# Patient Record
Sex: MALE | Race: WHITE | NOT HISPANIC OR LATINO | Employment: FULL TIME | URBAN - METROPOLITAN AREA
[De-identification: names, ages, dates, MRNs, and addresses within clinical notes are randomized per-mention and may not be internally consistent; named-entity substitution may affect disease eponyms.]

---

## 2019-06-19 ENCOUNTER — OFFICE VISIT (OUTPATIENT)
Dept: INTERNAL MEDICINE | Facility: CLINIC | Age: 39
End: 2019-06-19
Payer: COMMERCIAL

## 2019-06-19 VITALS
TEMPERATURE: 98.5 F | OXYGEN SATURATION: 98 % | SYSTOLIC BLOOD PRESSURE: 140 MMHG | HEART RATE: 70 BPM | BODY MASS INDEX: 44.35 KG/M2 | DIASTOLIC BLOOD PRESSURE: 100 MMHG | WEIGHT: 315 LBS

## 2019-06-19 DIAGNOSIS — Z13.6 CARDIOVASCULAR SCREENING; LDL GOAL LESS THAN 130: ICD-10-CM

## 2019-06-19 DIAGNOSIS — Z13.6 CARDIOVASCULAR SCREENING; LDL GOAL LESS THAN 160: ICD-10-CM

## 2019-06-19 DIAGNOSIS — S16.1XXA NECK MUSCLE STRAIN, INITIAL ENCOUNTER: Primary | ICD-10-CM

## 2019-06-19 DIAGNOSIS — R03.0 ELEVATED BLOOD PRESSURE READING IN OFFICE WITHOUT DIAGNOSIS OF HYPERTENSION: ICD-10-CM

## 2019-06-19 DIAGNOSIS — E66.01 MORBID OBESITY WITH BMI OF 40.0-44.9, ADULT (H): ICD-10-CM

## 2019-06-19 PROCEDURE — 99214 OFFICE O/P EST MOD 30 MIN: CPT | Performed by: INTERNAL MEDICINE

## 2019-06-19 NOTE — PROGRESS NOTES
Subjective     Junito Coelho is a 39 year old male who presents to clinic today for the following health issues:    HPI   Neck Pain  Onset: 4 days    Description:   Location: Right  Side of neck   Radiation: none    Intensity: moderate    Progression of Symptoms:  same    Accompanying Signs & Symptoms:  Burning, prickly sensation (paresthesias) in arm(s): no   Numbness in arm(s): no   Weakness in arm(s):  no   Fever: no   Headache: YES  Nausea and/or vomiting: no     History:   Trauma: no   Previous neck pain: no   Previous surgery or injections: no   Previous Imaging (MRI,X ray): no     Precipitating factors:   Does movement increase the pain:  YES    Alleviating factors:  none    Therapies Tried and outcome:  none            Reviewed and updated as needed this visit by Provider         Review of Systems         Objective    BP (!) 140/100   Pulse 70   Temp 98.5  F (36.9  C) (Temporal)   Wt 144.2 kg (318 lb)   SpO2 98%   BMI 44.35 kg/m    Body mass index is 44.35 kg/m .  Physical Exam                 Past Medical History:  ---------------------------  Past Medical History:   Diagnosis Date     Abdominal pain      Gastro-oesophageal reflux disease      Morbid obesity with BMI of 40.0-44.9, adult (H)     BMI 44       Past Surgical History:  ---------------------------  Past Surgical History:   Procedure Laterality Date     LAPAROSCOPIC CHOLECYSTECTOMY N/A 6/19/2015    Procedure: LAPAROSCOPIC CHOLECYSTECTOMY;  Surgeon: John Yepez MD;  Location: [facility]     ORTHOPEDIC SURGERY      right hand       Current Medications:  ---------------------------  Current Outpatient Medications   Medication Sig Dispense Refill     ibuprofen (ADVIL) 200 MG capsule Take 400 mg by mouth every 2 hours as needed       Ranitidine HCl (ZANTAC PO)          Allergies:  -------------  No Known Allergies    Social History:  -------------------  Social History     Socioeconomic History     Marital status: Single     Spouse name: Not  on file     Number of children: Not on file     Years of education: Not on file     Highest education level: Not on file   Occupational History     Not on file   Social Needs     Financial resource strain: Not on file     Food insecurity:     Worry: Not on file     Inability: Not on file     Transportation needs:     Medical: Not on file     Non-medical: Not on file   Tobacco Use     Smoking status: Current Some Day Smoker     Packs/day: 0.50     Years: 15.00     Pack years: 7.50     Smokeless tobacco: Never Used   Substance and Sexual Activity     Alcohol use: No     Alcohol/week: 0.0 oz     Drug use: No     Sexual activity: Not on file   Lifestyle     Physical activity:     Days per week: Not on file     Minutes per session: Not on file     Stress: Not on file   Relationships     Social connections:     Talks on phone: Not on file     Gets together: Not on file     Attends Taoist service: Not on file     Active member of club or organization: Not on file     Attends meetings of clubs or organizations: Not on file     Relationship status: Not on file     Intimate partner violence:     Fear of current or ex partner: Not on file     Emotionally abused: Not on file     Physically abused: Not on file     Forced sexual activity: Not on file   Other Topics Concern     Not on file   Social History Narrative     Not on file       Family Medical History:  ------------------------------  Family History   Problem Relation Age of Onset     Hypertension Father      Coronary Artery Disease Father 56        heart attack at 56     Cerebrovascular Disease Mother 62        acute stroke with residual left sided hemiparesis     Family History Negative Sister      Family History Negative Sister      Family History Negative Sister      Family History Negative Sister          ROS:  REVIEW OF SYSTEMS:    RESP: negative for cough, dyspnea, wheezing, hemoptysis  CV: negative for chest pain, palpitations, PND, VILLARREAL, orthopnea; reports no  significant changes in their ability to perform physical activity (from cardiovascular standpoint)  GI: negative for dysphagia, N/V, pain, melena, diarrhea and constipation  NEURO: negative for new numbness/tingling, paralysis, incoordination, or focal weakness     OBJECTIVE:                                                    BP (!) 140/100   Pulse 70   Temp 98.5  F (36.9  C) (Temporal)   Wt 144.2 kg (318 lb)   SpO2 98%   BMI 44.35 kg/m       GENERAL alert and no distress  EYES:  Normal sclera,conjunctiva, EOMI  HENT: oral and posterior pharynx without lesions or erythema, facies symmetric  NECK: Neck supple. No LAD, without thyroidmegaly.  Neck shows tight cervical para cervical muscles in spasm. Palpation of these muscles does reproduce the pain exactly.   Normal , finger, bicep, and tricep strength in both arms.  Negative spurlings maneuver.   Normal bicep, tricep, brachioradialis reflexs on both sides.  Neck has FROM in all directions.  No pain with direct palpation of the cervical bodies themselves, the pain is in the surrounding soft/connective tissues.  RESP: Clear to ausculation bilaterally without wheezes or crackles. Normal BS in all fields.  CV: RRR normal S1S2 without murmurs, rubs or gallops.  LYMPH: no cervical lymph adenopathy appreciated  MS: extremities- no gross deformities of the visible extremities noted,   EXT:  no lower extremity edema  PSYCH: Alert and oriented times 3; speech- coherent  SKIN:  No obvious significant skin lesions on visible portions of face          ASSESSMENT/PLAN:                                                      (S16.1XXA) Neck muscle strain, initial encounter  (primary encounter diagnosis)  Comment: No evidence of disc herniation or verterbal injury.    Discussed typical mechanical neck pain, typical causes, and atypical neck pain, including red flag symptoms.  Discussed conservative tratments inclduing physical therpy, stretching and strengthening, use of heat  and/or ice, NSAIDs with food, antispasmodics where indicated, and the use of narcotic pain meds where indicated.      Plan:     (E66.01,  Z68.41) Obesity with BMI of 40.0-44.9, adult (H)  Comment: The patient's current BMI is: Body mass index is 44.35 kg/m ..  Reviewed their weight patterns.   Discussed obesity as a biological preventable and treatable disease, which is associated with significantly increased risk of many acute and chronic health conditions. Obesity has now been recognized as a chronic disease by the American Medical Association.  Discussed serious co-morbidities associated with obesity including increased risk for hypertension, stroke, coronary artery disease, dyslipidemia, Type II diabetes, depression, sleep apnea, cancers of the colon, breast and endometrium, obstructive sleep apnea, osteoarthritis and female infertility.  Recommended regular aerobic exercise, recommended improved diet aiming at lowering amount of processed foods, lower sugars, and lower wheat products, and moderation carbs and fat in the diet, establishing more regular meal times, always eating breakfast, front loading some of the calories and adding more protein to the diet.     Briefly discussed bariatric surgery as a consideration, especially in patients with BMI greater than or equal to 35 kg/m2 with multiple complications.     Plan: Lipid panel reflex to direct LDL Fasting,         Comprehensive metabolic panel, CBC with         platelets            (Z13.6) CARDIOVASCULAR SCREENING; LDL GOAL LESS THAN 160  Comment: Discussed cardiac disease risk factors and cardiac disease risk factor modification.   Plan:     (R03.0) Elevated blood pressure reading in office without diagnosis of hypertension  Comment:   Plan:     (Z13.6) CARDIOVASCULAR SCREENING; LDL GOAL LESS THAN 130  Comment:   Plan: Lipid panel reflex to direct LDL Fasting,         Comprehensive metabolic panel, CBC with         platelets              See Patient  Instructions    JEROME PINEDO M.D., MD  [facility]    (Chart documentation may have been completed, in part, with [employer] voice-recognition software. Even though reviewed, some grammatical, spelling, and word errors may remain.)

## 2019-06-19 NOTE — LETTER
[facility]  [address] [address]  [phone]        October 22, 2019    Junito Coelho  [address] [place] [id number]              Dear Junito Coelho    This is to remind you that your fasting labs is due.    You may call our office at [phone] to schedule an appointment.    Please disregard this notice if you have already had your labs drawn or made an appointment.        Sincerely,        Andrae Castorena MD

## 2019-06-19 NOTE — PATIENT INSTRUCTIONS
"  CERVICAL MUSCLE/NECK PAIN:     *  Based on your history and exam,  No evidence for herniated disc, spinal stenosis, or vertebral fracture or any other concerning cause.    *  Over the counter Anti-inflammatory medicine.  Take one of the following:     --Aleve 2 tablets twice per day (with food) every day for the next one week, then twice per day as needed.     OR   --Motrin/Advil/Ibuprofen 400-600 mg three times pre day for the next one week, then 2-3 times per day as needed    *  Moist heat in the form of a shower, hot towel, or microwavable bean bag few times per day can help relieve some of the spasm.  One example is a \"Bed Buddy\" available at most drug stores such as [employer].  See the web page :  Http://www.[employer]/products/detail/292 for more details.      *  If your symptoms worsen, do not improve, or If you develop worsening or concerning symptoms like fevers, chills, swallowing or speech difficulties, please contact us.  .         * Your blood pressure is slightly higher than it should be.  I cannot technically call it hypertension yet, and need to have more readings to get a sense of what your readings truly are.     Check you blood pressure outside the clinic and accumulate 10-15 readings over the next 2 months and bring these to your appointment.  If the readings are above 140/90 on a regular basis, that would represent Hypertension and would indicate treatment.       Blood pressure goals:  ==============================================================    Normal: less than 130/80  High normal: 130-140/80-90  Hypertension:  greater than 140/90    Call if blood pressure frequently outside of this range, especially associated with side effects.        *  Return to see me in 3 months for a routine physical, sooner if needed.  Please get fasting labs done at the [facility] or any other [facility] lab 1-2 days before this appointment.  If you get the labs done at another " [facility], make arrangements with them directly.  The orders will be in place.  Eat nothing for at least 8 hours prior to having these labs drawn.  Call [phone] to schedule appointments at [facility].

## 2020-08-28 ENCOUNTER — OFFICE VISIT (OUTPATIENT)
Dept: INTERNAL MEDICINE | Facility: CLINIC | Age: 40
End: 2020-08-28
Payer: COMMERCIAL

## 2020-08-28 VITALS
WEIGHT: 315 LBS | HEIGHT: 71 IN | SYSTOLIC BLOOD PRESSURE: 181 MMHG | BODY MASS INDEX: 44.1 KG/M2 | DIASTOLIC BLOOD PRESSURE: 119 MMHG | HEART RATE: 74 BPM | RESPIRATION RATE: 18 BRPM | OXYGEN SATURATION: 97 %

## 2020-08-28 DIAGNOSIS — K46.9 ABDOMINAL HERNIA WITHOUT OBSTRUCTION AND WITHOUT GANGRENE, RECURRENCE NOT SPECIFIED, UNSPECIFIED HERNIA TYPE: ICD-10-CM

## 2020-08-28 DIAGNOSIS — I10 HTN, GOAL BELOW 140/90: ICD-10-CM

## 2020-08-28 DIAGNOSIS — E66.01 MORBID OBESITY WITH BMI OF 40.0-44.9, ADULT (H): ICD-10-CM

## 2020-08-28 DIAGNOSIS — R06.83 SNORING: ICD-10-CM

## 2020-08-28 DIAGNOSIS — Z13.1 SCREENING FOR DIABETES MELLITUS: ICD-10-CM

## 2020-08-28 DIAGNOSIS — Z00.00 ROUTINE GENERAL MEDICAL EXAMINATION AT A HEALTH CARE FACILITY: Primary | ICD-10-CM

## 2020-08-28 PROCEDURE — 99213 OFFICE O/P EST LOW 20 MIN: CPT | Mod: 25 | Performed by: INTERNAL MEDICINE

## 2020-08-28 PROCEDURE — 93000 ELECTROCARDIOGRAM COMPLETE: CPT | Performed by: INTERNAL MEDICINE

## 2020-08-28 PROCEDURE — 99395 PREV VISIT EST AGE 18-39: CPT | Performed by: INTERNAL MEDICINE

## 2020-08-28 RX ORDER — AMLODIPINE BESYLATE 5 MG/1
5 TABLET ORAL DAILY
Qty: 30 TABLET | Refills: 1 | Status: SHIPPED | OUTPATIENT
Start: 2020-08-28 | End: 2020-10-12

## 2020-08-28 ASSESSMENT — ENCOUNTER SYMPTOMS
PARESTHESIAS: 0
FEVER: 0
JOINT SWELLING: 0
NAUSEA: 0
CONSTIPATION: 0
FREQUENCY: 0
ARTHRALGIAS: 1
DYSURIA: 0
HEADACHES: 0
PALPITATIONS: 0
SHORTNESS OF BREATH: 0
HEARTBURN: 0
NERVOUS/ANXIOUS: 0
SORE THROAT: 0
MYALGIAS: 0
DIZZINESS: 0
HEMATURIA: 0
WEAKNESS: 0
HEMATOCHEZIA: 0
CHILLS: 0
EYE PAIN: 0
ABDOMINAL PAIN: 0
DIARRHEA: 0
COUGH: 0

## 2020-08-28 ASSESSMENT — MIFFLIN-ST. JEOR: SCORE: 2415.86

## 2020-08-28 ASSESSMENT — PATIENT HEALTH QUESTIONNAIRE - PHQ9
SUM OF ALL RESPONSES TO PHQ QUESTIONS 1-9: 0
10. IF YOU CHECKED OFF ANY PROBLEMS, HOW DIFFICULT HAVE THESE PROBLEMS MADE IT FOR YOU TO DO YOUR WORK, TAKE CARE OF THINGS AT HOME, OR GET ALONG WITH OTHER PEOPLE: NOT DIFFICULT AT ALL
SUM OF ALL RESPONSES TO PHQ QUESTIONS 1-9: 0

## 2020-08-28 NOTE — PROGRESS NOTES
Dr Gold's note    Patient's instructions / PLAN:                                                        Plan:  1. Please make a lab appointment for fasting labs    2. Start Amlodipine 5 mg daily - for the blood pressure   3. Sleep center referral   4. Low salt diet will help the blood pressure  5. Blood pressure machine with a big cuff - it will be beneficial for you   6. Referral  [facility], [place] [phone]  7. Follow up 3-4 weeks         ASSESSMENT & PLAN:                                                      (Z00.00) Routine general medical examination at a health care facility  (primary encounter diagnosis)  Comment:   Plan: CBC with platelets, Comprehensive metabolic         panel, Lipid panel reflex to direct LDL         Fasting, Albumin Random Urine Quantitative with        Creat Ratio, TSH with free T4 reflex            (I10) HTN, goal below 140/90  Comment: new dx. we discussed the importance of having the blood pressure under control  We discussed about the new meds, advantages and potential side effects. The patient will read also the info from the pharmacy and call back if questions.   Plan: EKG 12-lead complete w/read - Clinics,         amLODIPine (NORVASC) 5 MG tablet, CBC with         platelets, Comprehensive metabolic panel, Lipid        panel reflex to direct LDL Fasting, Albumin         Random Urine Quantitative with Creat Ratio, TSH        with free T4 reflex, SLEEP EVALUATION &         MANAGEMENT REFERRAL - [facility]         [facility] [place]  [phone] (Age 18 and         up)            (R06.83) Snoring  Comment: We discussed the importance of having JAYDEN treated if he is diagnosed with it   Plan: SLEEP EVALUATION & MANAGEMENT REFERRAL - [facility]          [phone] (Age 18 and up)            (K46.9) Abdominal hernia without obstruction and without gangrene, recurrence not specified, unspecified hernia  type  Comment:   Plan: GENERAL SURG ADULT REFERRAL            (Z13.1) Screening for diabetes mellitus  Comment:   Plan: Hemoglobin A1c            (E66.01,  Z68.41) Obesity with BMI of 40.0-44.9, adult (H)  Comment:   Plan:  we discussed about diet ( reducing calories intake) and increasing the exercise           Chief Complaint:                                                      Annual exam  Follow up chronic medical problems      SUBJECTIVE:                                                    History of present illness     We reviewed the chronic medical problems as above.   I reviewed the recent tests results in Epic       Fam Hx of HTN and HLip    DOT told him he might have umbilical hernia. I felt something abobe umilical area, but not sure due to fat abd    HTN  --He had a blood pressure check to DOT exam.  He does not remember the numbers.  He does not remember he was told it is very high  --I checked the blood pressure twice and it is in the same range    ROS:    See below        PMHx: - reviewed  Past Medical History:   Diagnosis Date     Abdominal pain      Gastro-oesophageal reflux disease      Morbid obesity with BMI of 40.0-44.9, adult (H)     BMI 44         PSHx: reviewed  Past Surgical History:   Procedure Laterality Date     LAPAROSCOPIC CHOLECYSTECTOMY N/A 6/19/2015    Procedure: LAPAROSCOPIC CHOLECYSTECTOMY;  Surgeon: John Yepez MD;  Location: [facility]     ORTHOPEDIC SURGERY      right hand        Soc Hx: No daily alcohol, no smoking  Social History     Socioeconomic History     Marital status: Single     Spouse name: Not on file     Number of children: Not on file     Years of education: Not on file     Highest education level: Not on file   Occupational History     Not on file   Social Needs     Financial resource strain: Not on file     Food insecurity     Worry: Not on file     Inability: Not on file     Transportation needs     Medical: Not on file     Non-medical: Not on file  "  Tobacco Use     Smoking status: Current Some Day Smoker     Packs/day: 0.50     Years: 15.00     Pack years: 7.50     Smokeless tobacco: Never Used   Substance and Sexual Activity     Alcohol use: No     Alcohol/week: 0.0 standard drinks     Drug use: No     Sexual activity: Not on file   Lifestyle     Physical activity     Days per week: Not on file     Minutes per session: Not on file     Stress: Not on file   Relationships     Social connections     Talks on phone: Not on file     Gets together: Not on file     Attends Mu-ism service: Not on file     Active member of club or organization: Not on file     Attends meetings of clubs or organizations: Not on file     Relationship status: Not on file     Intimate partner violence     Fear of current or ex partner: Not on file     Emotionally abused: Not on file     Physically abused: Not on file     Forced sexual activity: Not on file   Other Topics Concern     Not on file   Social History Narrative     Not on file        Fam Hx: reviewed  Family History   Problem Relation Age of Onset     Cerebrovascular Disease Mother 62        acute stroke with residual left sided hemiparesis     Hypertension Father      Coronary Artery Disease Father 56        heart attack at 56     Family History Negative Sister      Family History Negative Sister      Family History Negative Sister      Family History Negative Sister          Screening: reviewed    All: reviewed    Meds: reviewed  Current Outpatient Medications   Medication Sig Dispense Refill     ibuprofen (ADVIL) 200 MG capsule Take 400 mg by mouth every 2 hours as needed       Ranitidine HCl (ZANTAC PO)              OBJECTIVE:                                                    Physical Exam :    Blood pressure (!) 181/119, pulse 74, resp. rate 18, height 1.803 m (5' 11\"), weight 147.9 kg (326 lb), SpO2 97 %.     NAD, appears comfortable  Skin clear, no rashes  HEENT: Crowded throat  Neck: supple, no JVD,  no " thyroidmegaly  Lymph nodes non palpable in the cervical, supraclavicular axillaries,  Chest: clear to auscultation with good respiratory effort  Cardiac: S1S2, RRR, no mgr appreciated  Abdomen: soft, not tender, not distended, audible bowel sound, no hepatosplenomegaly, no palpable masses, no abdominal bruits  Extremities: no cyanosis, clubbing or edema.   Neuro: A, Ox3, no focal signs.        Rosa Gold MD  Internal Medicine       SUBJECTIVE:   CC: Junito Coelho is an 40 year old male who presents for preventative health visit.     Healthy Habits:     Getting at least 3 servings of Calcium per day:  NO    Bi-annual eye exam:  NO    Dental care twice a year:  NO    Sleep apnea or symptoms of sleep apnea:  None, Daytime drowsiness and Excessive snoring    Diet:  Other    Frequency of exercise:  None    Taking medications regularly:  Yes    Medication side effects:  None    PHQ-2 Total Score: 0    Additional concerns today:  No    Ability to successfully perform activities of daily living: Yes, no assistance needed  Home safety:  none identified   Hearing impairment: No            Today's PHQ-2 Score:   PHQ-2 ( 1999 [employer]) 8/28/2020   Q1: Little interest or pleasure in doing things 0   Q2: Feeling down, depressed or hopeless 0   PHQ-2 Score 0   Q1: Little interest or pleasure in doing things Not at all   Q2: Feeling down, depressed or hopeless Not at all   PHQ-2 Score 0       Abuse: Current or Past(Physical, Sexual or Emotional)- No  Do you feel safe in your environment? Yes        Social History     Tobacco Use     Smoking status: Current Some Day Smoker     Packs/day: 0.50     Years: 15.00     Pack years: 7.50     Smokeless tobacco: Never Used   Substance Use Topics     Alcohol use: No     Alcohol/week: 0.0 standard drinks         Alcohol Use 8/28/2020   Prescreen: >3 drinks/day or >7 drinks/week? No       Last PSA: No results found for: PSA    Reviewed orders with patient. Reviewed health maintenance and  "updated orders accordingly - Yes  Labs reviewed in EPIC    Reviewed and updated as needed this visit by clinical staff  Allergies  Meds         Reviewed and updated as needed this visit by Provider            Review of Systems   Constitutional: Negative for chills and fever.   HENT: Negative for congestion, ear pain, hearing loss and sore throat.    Eyes: Negative for pain and visual disturbance.   Respiratory: Negative for cough and shortness of breath.    Cardiovascular: Negative for chest pain, palpitations and peripheral edema.   Gastrointestinal: Negative for abdominal pain, constipation, diarrhea, heartburn, hematochezia and nausea.   Genitourinary: Negative for dysuria, frequency, genital sores, hematuria and urgency.   Musculoskeletal: Positive for arthralgias. Negative for joint swelling and myalgias.   Skin: Negative for rash.   Neurological: Negative for dizziness, weakness, headaches and paresthesias.   Psychiatric/Behavioral: Negative for mood changes. The patient is not nervous/anxious.          COUNSELING:   Reviewed preventive health counseling, as reflected in patient instructions       Regular exercise       Healthy diet/nutrition    Estimated body mass index is 44.35 kg/m  as calculated from the following:    Height as of 7/11/16: 1.803 m (5' 11\").    Weight as of 6/19/19: 144.2 kg (318 lb).     Weight management plan: Discussed healthy diet and exercise guidelines    He reports that he has been smoking. He has a 7.50 pack-year smoking history. He has never used smokeless tobacco.  Tobacco Cessation Action Plan:   .  I advised him to quit.  I explained him it increases his the risk for cardiovascular events      Counseling Resources:  ATP IV Guidelines  Pooled Cohorts Equation Calculator  FRAX Risk Assessment  ICSI Preventive Guidelines  Dietary Guidelines for Americans, 2010  USDA's MyPlate  ASA Prophylaxis  Lung CA Screening    Rosa Maldonado MD  [facility] " TEREZA  Answers for HPI/ROS submitted by the patient on 8/28/2020   Annual Exam:  If you checked off any problems, how difficult have these problems made it for you to do your work, take care of things at home, or get along with other people?: Not difficult at all  PHQ9 TOTAL SCORE: 0

## 2020-08-28 NOTE — PATIENT INSTRUCTIONS
Plan:  1. Please make a lab appointment for fasting labs    2. Start Amlodipine 5 mg daily - for the blood pressure   3. Sleep center referral   4. Low salt diet will help the blood pressure  5. Blood pressure machine with a big cuff - it will be beneficial for you   6. Referral  [facility], [place] [phone]  7. Follow up 3-4 weeks

## 2020-08-29 ASSESSMENT — PATIENT HEALTH QUESTIONNAIRE - PHQ9: SUM OF ALL RESPONSES TO PHQ QUESTIONS 1-9: 0

## 2020-09-14 ENCOUNTER — OFFICE VISIT (OUTPATIENT)
Dept: SURGERY | Facility: CLINIC | Age: 40
End: 2020-09-14
Payer: COMMERCIAL

## 2020-09-14 VITALS
OXYGEN SATURATION: 97 % | HEIGHT: 71 IN | WEIGHT: 315 LBS | DIASTOLIC BLOOD PRESSURE: 110 MMHG | BODY MASS INDEX: 44.1 KG/M2 | RESPIRATION RATE: 16 BRPM | HEART RATE: 80 BPM | SYSTOLIC BLOOD PRESSURE: 162 MMHG

## 2020-09-14 DIAGNOSIS — K43.2 INCISIONAL HERNIA, WITHOUT OBSTRUCTION OR GANGRENE: Primary | ICD-10-CM

## 2020-09-14 PROCEDURE — 99243 OFF/OP CNSLTJ NEW/EST LOW 30: CPT | Performed by: SURGERY

## 2020-09-14 ASSESSMENT — MIFFLIN-ST. JEOR: SCORE: 2415.86

## 2020-09-14 NOTE — PROGRESS NOTES
[facility]  New Patient Office Visit      Assessment:    Junito Coelho is a 40 year old male with a Primary, reducible incisional hernia.    Plan:    Currently hernia is asymptomatic. We discussed requirements prior to repair would include smoking cessation and weight loss. Current BMI is 45. Goal BMI <35 or 20% weight loss, which would be about 70 lbs.  Briefly discussed weight loss programs available. Pt will work on weight loss and consider surgery, currently okay with just watching the hernia.    We have discussed observation, reduction techniques and importance, incarceration and strangulation signs, symptoms and importance as well as need to seek emergency treatment.      He has been given literature to review.         Junito Coelho is seen in consultation for a hernia, at the request of Rosa Maldonado MD.      HPI:  Junito Coelho is a 40 year old male who presents for evaluation of a lump in the upper midline.  He first noticed a lump when his primary doctor did his physical recently    He does not have pain unless his abdomen is hit, like if his 7 year old son jumps on him.     Nausea/vomitting/bloating:  No    Previous surgery in this location:  Yes - paul rodriguez 2015     Previous herniorrhaphy:  No     Constipation: No  Cough: No  Diabetes: No  Current Smoker: Yes  Heavy lifting > 20 lb: No     Past Medical History:  Past Medical History:   Diagnosis Date     Abdominal pain      Gastro-oesophageal reflux disease      Morbid obesity with BMI of 40.0-44.9, adult (H)     BMI 44       Past Surgical History:  Past Surgical History:   Procedure Laterality Date     LAPAROSCOPIC CHOLECYSTECTOMY N/A 6/19/2015    Procedure: LAPAROSCOPIC CHOLECYSTECTOMY;  Surgeon: John Yepez MD;  Location: [facility]     ORTHOPEDIC SURGERY      right hand        Social History:  Social History     Tobacco Use     Smoking status: Current Some Day Smoker     Packs/day: 0.50     Years: 15.00     Pack  "years: 7.50     Smokeless tobacco: Never Used   Substance Use Topics     Alcohol use: No     Alcohol/week: 0.0 standard drinks     Drug use: No          Family History:  Family History   Problem Relation Age of Onset     Cerebrovascular Disease Mother 62        acute stroke with residual left sided hemiparesis     Hypertension Father      Coronary Artery Disease Father 56        heart attack at 56     Family History Negative Sister      Family History Negative Sister      Family History Negative Sister      Family History Negative Sister        ROS:  The 10 point review of systems is negative other than noted in the HPI and/or below.    PE:    Vitals: BP (!) 162/110   Pulse 80   Resp 16   Ht 1.803 m (5' 11\")   Wt 147.9 kg (326 lb)   SpO2 97%   BMI 45.47 kg/m    BMI= Body mass index is 45.47 kg/m .    General: Generally appears well.  Psych: Alert and Oriented.  Normal affect  Neurological: non-focal, moves extremities symmetrically, grossly normal strength and sensation  Eyes: Sclera clear  Respiratory: Breathing comfortably on room air  Cardiovascular:  Regular rate on pulse check  GI: Abdomen Obese. Small Bulge just above prior supraumbilical incision, reduces on laying down, non-tender. Defect feels fairly small but difficult to tell due to body habitus  MSK: extremities without edema  Integumentary:  No rashes      Time spent with the patient with greater that 50% of the time in discussion was 20 minutes.     Kenna Cadet MD  09/14/20 4:21 PM     Please route or send letter to:  Referring Provider    "

## 2020-09-14 NOTE — LETTER
"2020    RE:Junito Coelho, : 1980      [facility]  New Patient Office Visit        Assessment:    Junito Coelho is a 40 year old male with a Primary, reducible incisional hernia.     Plan:    Currently hernia is asymptomatic. We discussed requirements prior to repair would include smoking cessation and weight loss. Current BMI is 45. Goal BMI <35 or 20% weight loss, which would be about 70 lbs.  Briefly discussed weight loss programs available. Pt will work on weight loss and consider surgery, currently okay with just watching the hernia.     We have discussed observation, reduction techniques and importance, incarceration and strangulation signs, symptoms and importance as well as need to seek emergency treatment.       He has been given literature to review.       Junito Coelho is seen in consultation for a hernia, at the request of Rosa Maldonado MD.      HPI:  Junito Coelho is a 40 year old male who presents for evaluation of a lump in the upper midline.  He first noticed a lump when his primary doctor did his physical recently     He does not have pain unless his abdomen is hit, like if his 7 year old son jumps on him.      Nausea/vomitting/bloating:  No    Previous surgery in this location:  Yes - paul rodriguez      Previous herniorrhaphy:  No      Constipation: No  Cough: No  Diabetes: No  Current Smoker: Yes  Heavy lifting > 20 lb: No      ROS:  The 10 point review of systems is negative other than noted in the HPI and/or below.     PE:    Vitals: BP (!) 162/110   Pulse 80   Resp 16   Ht 1.803 m (5' 11\")   Wt 147.9 kg (326 lb)   SpO2 97%   BMI 45.47 kg/m    BMI= Body mass index is 45.47 kg/m .     General: Generally appears well.  Psych: Alert and Oriented.  Normal affect  Neurological: non-focal, moves extremities symmetrically, grossly normal strength and sensation  Eyes: Sclera clear  Respiratory: Breathing comfortably on room air  Cardiovascular:  " Regular rate on pulse check  GI: Abdomen Obese. Small Bulge just above prior supraumbilical incision, reduces on laying down, non-tender. Defect feels fairly small but difficult to tell due to body habitus  MSK: extremities without edema  Integumentary:  No rashes          Kenna Cadet MD

## 2020-09-14 NOTE — PROGRESS NOTES
REVIEW OF SYSTEMS:    Ears/Nose/Throat: negative    Eyes: negative.      Respiratory: negative    Cardiovascular: negative    Gastrointestinal: negative    Genitourinary: negative    Musculoskeletal: negative      Neurologic: negative     Skin: negative     Psychiatric: negative     Hematologic/Lymphatic/Immunologic:  negative      Endocrine:  negative

## 2020-09-25 DIAGNOSIS — I10 HTN, GOAL BELOW 140/90: ICD-10-CM

## 2020-09-25 DIAGNOSIS — Z13.1 SCREENING FOR DIABETES MELLITUS: ICD-10-CM

## 2020-09-25 DIAGNOSIS — Z00.00 ROUTINE GENERAL MEDICAL EXAMINATION AT A HEALTH CARE FACILITY: ICD-10-CM

## 2020-09-25 LAB
ALBUMIN SERPL-MCNC: 3.6 G/DL (ref 3.4–5)
ALP SERPL-CCNC: 57 U/L (ref 40–150)
ALT SERPL W P-5'-P-CCNC: 94 U/L (ref 0–70)
ANION GAP SERPL CALCULATED.3IONS-SCNC: 7 MMOL/L (ref 3–14)
AST SERPL W P-5'-P-CCNC: 50 U/L (ref 0–45)
BILIRUB SERPL-MCNC: 0.8 MG/DL (ref 0.2–1.3)
BUN SERPL-MCNC: 17 MG/DL (ref 7–30)
CALCIUM SERPL-MCNC: 8.6 MG/DL (ref 8.5–10.1)
CHLORIDE SERPL-SCNC: 104 MMOL/L (ref 94–109)
CHOLEST SERPL-MCNC: 153 MG/DL
CO2 SERPL-SCNC: 25 MMOL/L (ref 20–32)
CREAT SERPL-MCNC: 1.2 MG/DL (ref 0.66–1.25)
CREAT UR-MCNC: 220 MG/DL
ERYTHROCYTE [DISTWIDTH] IN BLOOD BY AUTOMATED COUNT: 13.2 % (ref 10–15)
GFR SERPL CREATININE-BSD FRML MDRD: 76 ML/MIN/{1.73_M2}
GLUCOSE SERPL-MCNC: 90 MG/DL (ref 70–99)
HBA1C MFR BLD: 4.8 % (ref 0–5.6)
HCT VFR BLD AUTO: 46.8 % (ref 40–53)
HDLC SERPL-MCNC: 28 MG/DL
HGB BLD-MCNC: 16.4 G/DL (ref 13.3–17.7)
LDLC SERPL CALC-MCNC: 72 MG/DL
MCH RBC QN AUTO: 30.5 PG (ref 26.5–33)
MCHC RBC AUTO-ENTMCNC: 35 G/DL (ref 31.5–36.5)
MCV RBC AUTO: 87 FL (ref 78–100)
MICROALBUMIN UR-MCNC: 85 MG/L
MICROALBUMIN/CREAT UR: 38.73 MG/G CR (ref 0–17)
NONHDLC SERPL-MCNC: 125 MG/DL
PLATELET # BLD AUTO: 292 10E9/L (ref 150–450)
POTASSIUM SERPL-SCNC: 3.9 MMOL/L (ref 3.4–5.3)
PROT SERPL-MCNC: 7.7 G/DL (ref 6.8–8.8)
RBC # BLD AUTO: 5.38 10E12/L (ref 4.4–5.9)
SODIUM SERPL-SCNC: 136 MMOL/L (ref 133–144)
TRIGL SERPL-MCNC: 264 MG/DL
TSH SERPL DL<=0.005 MIU/L-ACNC: 1.83 MU/L (ref 0.4–4)
WBC # BLD AUTO: 8.2 10E9/L (ref 4–11)

## 2020-09-25 PROCEDURE — 84443 ASSAY THYROID STIM HORMONE: CPT | Performed by: INTERNAL MEDICINE

## 2020-09-25 PROCEDURE — 83036 HEMOGLOBIN GLYCOSYLATED A1C: CPT | Performed by: INTERNAL MEDICINE

## 2020-09-25 PROCEDURE — 36415 COLL VENOUS BLD VENIPUNCTURE: CPT | Performed by: INTERNAL MEDICINE

## 2020-09-25 PROCEDURE — 85027 COMPLETE CBC AUTOMATED: CPT | Performed by: INTERNAL MEDICINE

## 2020-09-25 PROCEDURE — 80061 LIPID PANEL: CPT | Performed by: INTERNAL MEDICINE

## 2020-09-25 PROCEDURE — 80053 COMPREHEN METABOLIC PANEL: CPT | Performed by: INTERNAL MEDICINE

## 2020-09-25 PROCEDURE — 82043 UR ALBUMIN QUANTITATIVE: CPT | Performed by: INTERNAL MEDICINE

## 2020-10-12 ENCOUNTER — OFFICE VISIT (OUTPATIENT)
Dept: INTERNAL MEDICINE | Facility: CLINIC | Age: 40
End: 2020-10-12
Payer: COMMERCIAL

## 2020-10-12 VITALS
DIASTOLIC BLOOD PRESSURE: 110 MMHG | WEIGHT: 315 LBS | HEART RATE: 89 BPM | OXYGEN SATURATION: 96 % | BODY MASS INDEX: 45.33 KG/M2 | SYSTOLIC BLOOD PRESSURE: 171 MMHG | RESPIRATION RATE: 18 BRPM

## 2020-10-12 DIAGNOSIS — I10 HTN, GOAL BELOW 140/90: Primary | ICD-10-CM

## 2020-10-12 DIAGNOSIS — Z23 NEED FOR PROPHYLACTIC VACCINATION AND INOCULATION AGAINST INFLUENZA: ICD-10-CM

## 2020-10-12 DIAGNOSIS — E66.01 MORBID OBESITY WITH BMI OF 40.0-44.9, ADULT (H): ICD-10-CM

## 2020-10-12 DIAGNOSIS — R79.89 LFTS ABNORMAL: ICD-10-CM

## 2020-10-12 PROCEDURE — 99214 OFFICE O/P EST MOD 30 MIN: CPT | Mod: 25 | Performed by: INTERNAL MEDICINE

## 2020-10-12 PROCEDURE — 90471 IMMUNIZATION ADMIN: CPT | Performed by: INTERNAL MEDICINE

## 2020-10-12 PROCEDURE — 90686 IIV4 VACC NO PRSV 0.5 ML IM: CPT | Performed by: INTERNAL MEDICINE

## 2020-10-12 RX ORDER — LISINOPRIL 20 MG/1
20 TABLET ORAL 2 TIMES DAILY
Qty: 60 TABLET | Refills: 3 | Status: SHIPPED | OUTPATIENT
Start: 2020-10-12 | End: 2020-11-13

## 2020-10-12 RX ORDER — AMLODIPINE BESYLATE 5 MG/1
5 TABLET ORAL DAILY
Qty: 30 TABLET | Refills: 3 | Status: SHIPPED | OUTPATIENT
Start: 2020-10-12 | End: 2020-11-13

## 2020-10-12 NOTE — PATIENT INSTRUCTIONS
Plan:  1. Continue Amlodipine 5 mg daily   2. Add Lisinopril 20 mg twice a day ( in a month you may take both tabs in the same time)   3. Please make a lab appointment for non fasting labs in about a month  4. Please make an appointment few days after the labs to discuss about the results. -  Video appointment is ok  5. weight loss will  help the blood pressure

## 2020-10-12 NOTE — PROGRESS NOTES
Patient's instructions / PLAN:                                                        Plan:  1. Continue Amlodipine 5 mg daily   2. Add Lisinopril 20 mg twice a day ( in a month you may take both tabs in the same time)   3. Please make a lab appointment for non fasting labs in about a month  4. Please make an appointment few days after the labs to discuss about the results. -  Video appointment is ok      ASSESSMENT & PLAN:                                                      (I10) HTN, goal below 140/90  (primary encounter diagnosis)  Comment: Not controlled   We discussed about the new meds, advantages and potential side effects. The patient will read also the info from the pharmacy and call back if questions.   Plan: Basic metabolic panel, amLODIPine (NORVASC) 5         MG tablet, lisinopril (ZESTRIL) 20 MG tablet            (R94.5) LFTs abnormal  Comment: likely fatty liver but advised to avoid alcohol   Plan: Hepatitis B surface antigen, Hepatitis B         Surface Antibody, **Hepatitis C Screen Reflex         to RNA FUTURE anytime, Hepatitis Antibody A         IgG, Ferritin, Iron and iron binding capacity            (E66.01,  Z68.41) Obesity with BMI of 40.0-44.9, adult (H)  Comment:   Plan:  we discussed about diet ( reducing calories intake) and increasing the exercise    Printed info provided     (Z23) Need for prophylactic vaccination and inoculation against influenza  Comment:   Plan: INFLUENZA VACCINE IM > 6 MONTHS VALENT IIV4         [52124], Vaccine Administration, Initial         [32677]               Chief Complaint:                                                      Follow up chronic medical problems        SUBJECTIVE:                                                    History of present illness     BP at home 170/110   Tolerates amlodipine     ROS:                                                      ROS: negative for fever, chills, cough, wheezes, chest pain, shortness of breath, vomiting,  abdominal pain, leg swelling     OBJECTIVE:                                                    Physical Exam :    Blood pressure (!) 171/110, pulse 89, resp. rate 18, weight 147.4 kg (325 lb), SpO2 96 %.   NAD, appears comfortable      PMHx: reviewed  Past Medical History:   Diagnosis Date     Abdominal pain      Gastro-oesophageal reflux disease      Morbid obesity with BMI of 40.0-44.9, adult (H)     BMI 44      PSHx: reviewed  Past Surgical History:   Procedure Laterality Date     LAPAROSCOPIC CHOLECYSTECTOMY N/A 6/19/2015    Procedure: LAPAROSCOPIC CHOLECYSTECTOMY;  Surgeon: John Yepez MD;  Location: [facility]     ORTHOPEDIC SURGERY      right hand        Meds: reviewed  Current Outpatient Medications   Medication Sig Dispense Refill     amLODIPine (NORVASC) 5 MG tablet Take 1 tablet (5 mg) by mouth daily 30 tablet 1       Soc Hx: reviewed  Fam Hx: reviewed          Rosa Gold MD  Internal Medicine

## 2020-11-10 DIAGNOSIS — I10 HTN, GOAL BELOW 140/90: ICD-10-CM

## 2020-11-10 DIAGNOSIS — R79.89 LFTS ABNORMAL: ICD-10-CM

## 2020-11-10 PROCEDURE — 86708 HEPATITIS A ANTIBODY: CPT | Performed by: INTERNAL MEDICINE

## 2020-11-10 PROCEDURE — 86803 HEPATITIS C AB TEST: CPT | Performed by: INTERNAL MEDICINE

## 2020-11-10 PROCEDURE — 83540 ASSAY OF IRON: CPT | Performed by: INTERNAL MEDICINE

## 2020-11-10 PROCEDURE — 87340 HEPATITIS B SURFACE AG IA: CPT | Performed by: INTERNAL MEDICINE

## 2020-11-10 PROCEDURE — 86706 HEP B SURFACE ANTIBODY: CPT | Performed by: INTERNAL MEDICINE

## 2020-11-10 PROCEDURE — 82728 ASSAY OF FERRITIN: CPT | Performed by: INTERNAL MEDICINE

## 2020-11-10 PROCEDURE — 83550 IRON BINDING TEST: CPT | Performed by: INTERNAL MEDICINE

## 2020-11-10 PROCEDURE — 80048 BASIC METABOLIC PNL TOTAL CA: CPT | Performed by: INTERNAL MEDICINE

## 2020-11-10 PROCEDURE — 36415 COLL VENOUS BLD VENIPUNCTURE: CPT | Performed by: INTERNAL MEDICINE

## 2020-11-11 ENCOUNTER — DOCUMENTATION ONLY (OUTPATIENT)
Dept: HEALTH INFORMATION MANAGEMENT | Facility: CLINIC | Age: 40
End: 2020-11-11

## 2020-11-11 LAB
ANION GAP SERPL CALCULATED.3IONS-SCNC: 7 MMOL/L (ref 3–14)
BUN SERPL-MCNC: 21 MG/DL (ref 7–30)
CALCIUM SERPL-MCNC: 9.2 MG/DL (ref 8.5–10.1)
CHLORIDE SERPL-SCNC: 102 MMOL/L (ref 94–109)
CO2 SERPL-SCNC: 27 MMOL/L (ref 20–32)
CREAT SERPL-MCNC: 1.32 MG/DL (ref 0.66–1.25)
FERRITIN SERPL-MCNC: 527 NG/ML (ref 26–388)
GFR SERPL CREATININE-BSD FRML MDRD: 67 ML/MIN/{1.73_M2}
GLUCOSE SERPL-MCNC: 103 MG/DL (ref 70–99)
HAV IGG SER QL IA: NONREACTIVE
HBV SURFACE AB SERPL IA-ACNC: 0 M[IU]/ML
HBV SURFACE AG SERPL QL IA: NONREACTIVE
HCV AB SERPL QL IA: NONREACTIVE
IRON SATN MFR SERPL: 27 % (ref 15–46)
IRON SERPL-MCNC: 88 UG/DL (ref 35–180)
POTASSIUM SERPL-SCNC: 4 MMOL/L (ref 3.4–5.3)
SODIUM SERPL-SCNC: 136 MMOL/L (ref 133–144)
TIBC SERPL-MCNC: 327 UG/DL (ref 240–430)

## 2020-11-13 ENCOUNTER — OFFICE VISIT (OUTPATIENT)
Dept: INTERNAL MEDICINE | Facility: CLINIC | Age: 40
End: 2020-11-13
Payer: COMMERCIAL

## 2020-11-13 VITALS
RESPIRATION RATE: 18 BRPM | DIASTOLIC BLOOD PRESSURE: 92 MMHG | BODY MASS INDEX: 45.47 KG/M2 | SYSTOLIC BLOOD PRESSURE: 142 MMHG | OXYGEN SATURATION: 96 % | WEIGHT: 315 LBS | HEART RATE: 84 BPM

## 2020-11-13 DIAGNOSIS — I10 HTN, GOAL BELOW 140/90: Primary | ICD-10-CM

## 2020-11-13 DIAGNOSIS — R79.89 LFTS ABNORMAL: ICD-10-CM

## 2020-11-13 PROCEDURE — 99214 OFFICE O/P EST MOD 30 MIN: CPT | Performed by: INTERNAL MEDICINE

## 2020-11-13 RX ORDER — LISINOPRIL 20 MG/1
20 TABLET ORAL DAILY
Qty: 90 TABLET | Refills: 0 | Status: SHIPPED | OUTPATIENT
Start: 2020-11-13 | End: 2020-12-07

## 2020-11-13 RX ORDER — AMLODIPINE BESYLATE 5 MG/1
5 TABLET ORAL 2 TIMES DAILY
Qty: 180 TABLET | Refills: 0 | Status: SHIPPED | OUTPATIENT
Start: 2020-11-13 | End: 2021-02-12

## 2020-11-13 NOTE — PATIENT INSTRUCTIONS
Plan:  1. Decrease Lisinopril to 20 mg daily  2. Increase Amlodipine to 5 mg twice a day   3. Liver ultrasound -- To schedule this test you may call Scheduling center at [phone]    4. If swollen legs or if the blood pressure stays constantly above 150 send a message in the chart and I will adjust the meds.   5. Please make a lab appointment for non fasting labs in January  6. Make sure you drink plenty of water the day of the blood test.    7. Please make an appointment few days after the labs to discuss about the results.

## 2020-11-13 NOTE — PROGRESS NOTES
Patient's instructions / PLAN:                                                        Plan:  1. Decrease Lisinopril to 20 mg daily  2. Increase Amlodipine to 5 mg twice a day   3. Liver ultrasound -- To schedule this test you may call Scheduling center at [phone]    4. If swollen legs or if the blood pressure stays constantly above 150 send a message in the chart and I will adjust the meds. ( add metoprolol 25 mg bid)   5. Please make a lab appointment for non fasting labs in January  6. Make sure you drink plenty of water the day of the blood test.    7. Please make an appointment few days after the labs to discuss about the results.         ASSESSMENT & PLAN:                                                      (I10) HTN, goal below 140/90  (primary encounter diagnosis)  Comment: Not controlled   Plan: amLODIPine (NORVASC) 5 MG tablet, lisinopril         (ZESTRIL) 20 MG tablet            (R79.89) Increase in creatinine  Comment: ? Lisinopril   Plan: as above     (R94.5) LFTs abnormal  Comment: ? etiol   Plan: US Abdomen Limited, Comprehensive metabolic         panel, Ferritin, Hemochromatosis mutation               Chief Complaint:                                                      HTN  CKD  LFTs    SUBJECTIVE:                                                    History of present illness       HTN  -- BP at home: 140/94    Creatinine 1.32 <--1.2    LOV Plan:  1. Continue Amlodipine 5 mg daily   2. Add Lisinopril 20 mg twice a day ( in a month you may take both tabs in the same time)   3. Please make a lab appointment for non fasting labs in about a month  4. Please make an appointment few days after the labs to discuss about the results. -  Video appointment is ok    ROS:                                                      ROS: negative for fever, chills, cough, wheezes, chest pain, shortness of breath, vomiting, abdominal pain, leg swelling     OBJECTIVE:                                                     Physical Exam :    Blood pressure (!) 142/92, pulse 84, resp. rate 18, weight 147.9 kg (326 lb), SpO2 96 %.   NAD, appears comfortable  Skin: no rashes   Neck: supple, no JVD, No thyroidmegaly. Lymph nodes nonpalpable cervical and supraclavicular.  Chest: clear to auscultation bilaterally, good respiratory effort  Heart: S1 S2, RRR, no mgr appreciated  Abdomen: soft, not tender,  Extremities: no edema,   Neurologic: A, Ox3, no focal signs appreciated    PMHx: reviewed  Past Medical History:   Diagnosis Date     Abdominal pain      Gastro-oesophageal reflux disease      Morbid obesity with BMI of 40.0-44.9, adult (H)     BMI 44      PSHx: reviewed  Past Surgical History:   Procedure Laterality Date     LAPAROSCOPIC CHOLECYSTECTOMY N/A 6/19/2015    Procedure: LAPAROSCOPIC CHOLECYSTECTOMY;  Surgeon: John Yepez MD;  Location: [facility]     ORTHOPEDIC SURGERY      right hand        Meds: reviewed  Current Outpatient Medications   Medication Sig Dispense Refill     amLODIPine (NORVASC) 5 MG tablet Take 1 tablet (5 mg) by mouth daily 30 tablet 3     lisinopril (ZESTRIL) 20 MG tablet Take 1 tablet (20 mg) by mouth 2 times daily 60 tablet 3       Soc Hx: reviewed  Fam Hx: reviewed          Rosa Gold MD  Internal Medicine

## 2020-12-05 DIAGNOSIS — I10 HTN, GOAL BELOW 140/90: ICD-10-CM

## 2020-12-07 RX ORDER — LISINOPRIL 20 MG/1
TABLET ORAL
Qty: 60 TABLET | Refills: 1 | Status: SHIPPED | OUTPATIENT
Start: 2020-12-07 | End: 2022-12-05

## 2021-02-12 DIAGNOSIS — I10 HTN, GOAL BELOW 140/90: ICD-10-CM

## 2021-02-12 RX ORDER — AMLODIPINE BESYLATE 5 MG/1
TABLET ORAL
Qty: 60 TABLET | Refills: 0 | Status: ON HOLD | OUTPATIENT
Start: 2021-02-12 | End: 2021-04-05

## 2021-02-12 NOTE — TELEPHONE ENCOUNTER
Pending Prescriptions:                       Disp   Refills    amLODIPine (NORVASC) 5 MG tablet [Pharmacy*180 ta*0        Sig: TAKE 1 TABLET BY MOUTH TWICE A DAY    Routing refill request to provider for review/approval because:  Blood pressures out of range      BP Readings from Last 3 Encounters:   11/13/20 (!) 142/92   10/12/20 (!) 171/110   09/14/20 (!) 162/110

## 2021-04-01 ENCOUNTER — HOSPITAL ENCOUNTER (EMERGENCY)
Facility: CLINIC | Age: 41
Discharge: HOME | End: 2021-04-01
Attending: EMERGENCY MEDICINE | Admitting: EMERGENCY MEDICINE
Payer: COMMERCIAL

## 2021-04-01 ENCOUNTER — NURSE TRIAGE (OUTPATIENT)
Dept: NURSING | Facility: CLINIC | Age: 41
End: 2021-04-01

## 2021-04-01 VITALS
WEIGHT: 310 LBS | RESPIRATION RATE: 16 BRPM | DIASTOLIC BLOOD PRESSURE: 90 MMHG | HEIGHT: 71 IN | BODY MASS INDEX: 43.4 KG/M2 | OXYGEN SATURATION: 99 % | HEART RATE: 71 BPM | SYSTOLIC BLOOD PRESSURE: 138 MMHG | TEMPERATURE: 97.7 F

## 2021-04-01 DIAGNOSIS — R10.13 ABDOMINAL PAIN, EPIGASTRIC: ICD-10-CM

## 2021-04-01 LAB
ALBUMIN SERPL-MCNC: 3.7 G/DL (ref 3.4–5)
ALP SERPL-CCNC: 61 U/L (ref 40–150)
ALT SERPL W P-5'-P-CCNC: 142 U/L (ref 0–70)
ANION GAP SERPL CALCULATED.3IONS-SCNC: 6 MMOL/L (ref 3–14)
AST SERPL W P-5'-P-CCNC: 138 U/L (ref 0–45)
BASOPHILS # BLD AUTO: 0.1 10E9/L (ref 0–0.2)
BASOPHILS NFR BLD AUTO: 0.8 %
BILIRUB SERPL-MCNC: 1 MG/DL (ref 0.2–1.3)
BUN SERPL-MCNC: 19 MG/DL (ref 7–30)
CALCIUM SERPL-MCNC: 8.4 MG/DL (ref 8.5–10.1)
CHLORIDE SERPL-SCNC: 104 MMOL/L (ref 94–109)
CO2 SERPL-SCNC: 26 MMOL/L (ref 20–32)
CREAT SERPL-MCNC: 1.18 MG/DL (ref 0.66–1.25)
DIFFERENTIAL METHOD BLD: NORMAL
EOSINOPHIL # BLD AUTO: 0.1 10E9/L (ref 0–0.7)
EOSINOPHIL NFR BLD AUTO: 1.7 %
ERYTHROCYTE [DISTWIDTH] IN BLOOD BY AUTOMATED COUNT: 12.2 % (ref 10–15)
GFR SERPL CREATININE-BSD FRML MDRD: 77 ML/MIN/{1.73_M2}
GLUCOSE SERPL-MCNC: 104 MG/DL (ref 70–99)
HCT VFR BLD AUTO: 46.7 % (ref 40–53)
HGB BLD-MCNC: 16.2 G/DL (ref 13.3–17.7)
IMM GRANULOCYTES # BLD: 0.1 10E9/L (ref 0–0.4)
IMM GRANULOCYTES NFR BLD: 0.8 %
INTERPRETATION ECG - MUSE: NORMAL
LIPASE SERPL-CCNC: 130 U/L (ref 73–393)
LYMPHOCYTES # BLD AUTO: 1.5 10E9/L (ref 0.8–5.3)
LYMPHOCYTES NFR BLD AUTO: 20 %
MCH RBC QN AUTO: 29.7 PG (ref 26.5–33)
MCHC RBC AUTO-ENTMCNC: 34.7 G/DL (ref 31.5–36.5)
MCV RBC AUTO: 86 FL (ref 78–100)
MONOCYTES # BLD AUTO: 0.6 10E9/L (ref 0–1.3)
MONOCYTES NFR BLD AUTO: 8.5 %
NEUTROPHILS # BLD AUTO: 5.1 10E9/L (ref 1.6–8.3)
NEUTROPHILS NFR BLD AUTO: 68.2 %
NRBC # BLD AUTO: 0 10*3/UL
NRBC BLD AUTO-RTO: 0 /100
PLATELET # BLD AUTO: 266 10E9/L (ref 150–450)
POTASSIUM SERPL-SCNC: 3.7 MMOL/L (ref 3.4–5.3)
PROT SERPL-MCNC: 7.6 G/DL (ref 6.8–8.8)
RBC # BLD AUTO: 5.45 10E12/L (ref 4.4–5.9)
SODIUM SERPL-SCNC: 136 MMOL/L (ref 133–144)
TROPONIN I SERPL-MCNC: <0.015 UG/L (ref 0–0.04)
WBC # BLD AUTO: 7.5 10E9/L (ref 4–11)

## 2021-04-01 PROCEDURE — 96374 THER/PROPH/DIAG INJ IV PUSH: CPT

## 2021-04-01 PROCEDURE — 80053 COMPREHEN METABOLIC PANEL: CPT | Performed by: EMERGENCY MEDICINE

## 2021-04-01 PROCEDURE — 250N000009 HC RX 250: Performed by: EMERGENCY MEDICINE

## 2021-04-01 PROCEDURE — 99284 EMERGENCY DEPT VISIT MOD MDM: CPT | Mod: 25

## 2021-04-01 PROCEDURE — 84484 ASSAY OF TROPONIN QUANT: CPT | Performed by: EMERGENCY MEDICINE

## 2021-04-01 PROCEDURE — 85025 COMPLETE CBC W/AUTO DIFF WBC: CPT | Performed by: EMERGENCY MEDICINE

## 2021-04-01 PROCEDURE — 93005 ELECTROCARDIOGRAM TRACING: CPT

## 2021-04-01 PROCEDURE — 83690 ASSAY OF LIPASE: CPT | Performed by: EMERGENCY MEDICINE

## 2021-04-01 PROCEDURE — 250N000011 HC RX IP 250 OP 636: Performed by: EMERGENCY MEDICINE

## 2021-04-01 PROCEDURE — 250N000013 HC RX MED GY IP 250 OP 250 PS 637: Performed by: EMERGENCY MEDICINE

## 2021-04-01 RX ORDER — ONDANSETRON 2 MG/ML
4 INJECTION INTRAMUSCULAR; INTRAVENOUS EVERY 30 MIN PRN
Status: DISCONTINUED | OUTPATIENT
Start: 2021-04-01 | End: 2021-04-01 | Stop reason: HOSPADM

## 2021-04-01 RX ADMIN — LIDOCAINE HYDROCHLORIDE 30 ML: 20 SOLUTION ORAL; TOPICAL at 08:25

## 2021-04-01 RX ADMIN — ONDANSETRON 4 MG: 2 INJECTION INTRAMUSCULAR; INTRAVENOUS at 08:23

## 2021-04-01 ASSESSMENT — ENCOUNTER SYMPTOMS
DIARRHEA: 0
ABDOMINAL PAIN: 1
NAUSEA: 0
VOMITING: 0

## 2021-04-01 ASSESSMENT — MIFFLIN-ST. JEOR: SCORE: 2343.28

## 2021-04-01 NOTE — ED PROVIDER NOTES
"  History   Chief Complaint:  Abdominal Pain       HPI   Junito Coelho is a 40 year old male with history of GERD and obesity who presents with abdominal pain. The patient says that he has been having abdominal pain since 0630 when he got to work. He says that it initially felt like a stomach ache coming on, but then the pressure worsened. He denies any trauma to the area. He notes that he was found to have a hernia a few months ago, but was told to lose weight before it could be operated on. Here in the ED, the patient says that the pain is not as bad as it was before. He denies any chest pain, nausea, vomiting, diarrhea, alcohol use yesterday, concerning foods, sick contacts, recent antibiotics, or use of tylenol or ibuprofen.       Review of Systems   Cardiovascular: Negative for chest pain.   Gastrointestinal: Positive for abdominal pain. Negative for diarrhea, nausea and vomiting.   All other systems reviewed and are negative.        Allergies:  No Known Drug Allergies     Medications:  Norvasc  Lisinopril     Past Medical History:    GERD  Obesity        Past Surgical History:    Cholecystectomy   Hand surgery       Family History:    Stroke  Hypertension   CAD    Social History:  Patient presents to the ED alone.     Physical Exam     Patient Vitals for the past 24 hrs:   BP Temp Temp src Pulse Resp SpO2 Height Weight   04/01/21 1059 -- -- -- -- 16 99 % -- --   04/01/21 1030 (!) 138/90 -- -- 71 -- -- -- --   04/01/21 0854 -- -- -- 65 -- -- -- --   04/01/21 0806 (!) 154/96 97.7  F (36.5  C) Oral 75 14 98 % 1.803 m (5' 11\") 140.6 kg (310 lb)       Physical Exam  General: Patient is alert and normal appearing.  HEENT: Head atraumatic    Eyes: pupils equal and reactive. Conjunctiva clear   Nares: patent   Oropharynx: no lesions, uvula midline, no palatal draping, normal voice, no trismus  Neck: Supple without lymphadenopathy, no meningismus  Chest: Heart regular rate and rhythm.   Lungs: Equal clear to " auscultation with no wheeze or rales  Abdomen: Soft, minimal epigastric tenderness to palpation with no rebound or guarding nondistended, normal bowel sounds  Back: No costovertebral angle tenderness, no midline C, T or L spine tenderness  Neuro: Grossly nonfocal, normal speech, strength equal bilaterally, CN 2-12 intact  Extremities: No deformities, equal radial and DP pulses. No clubbing, cyanosis.  No edema  Skin: Warm and dry with no rash.       Emergency Department Course     ECG  ECG taken at 0803, ECG read at 0933  Normal sinus rhythm  Nonspecific intraventricular conduction delay  Borderline ECG   Rate 68 bpm. MT interval 160 ms. QRS duration 122 ms. QT/QTc 392/416 ms. P-R-T axes 49 26 39.          Laboratory:    CBC: WBC 7.5, HGB 16.2,   CMP:  (H), calcium 8.4 (L),  (H),  (H) o/w WNL (Creatinine 1.18)  Lipase: 130  Troponin (Collected 0829): <0.015       Emergency Department Course:    Reviewed:  0743 I reviewed the patient's nursing notes, vitals, past medical records, Care Everywhere.        Assessments:  0745 I performed an exam of the patient as documented above.   1011 Patient rechecked and updated.        Interventions:  0823 Zofran 4 mg IV  0825 GI cocktail 30 mL Oral     Disposition:  The patient was discharged to home.       Impression & Plan       Medical Decision Making:  Junito Coelho is a 40 year old male who presents with abdominal pain and fullness in the epigastrium.  He looks overall well and without a concerning etiology of abdominal pain.  A broad differential diagnosis was of course considered.    The workup in the ED is at this point negative.  No etiology for the patients pain is found at this point and my suspicion of an intraabdominal catastrophe or other worrisome etiology is very low.   Patient's lab work is reassuring.  He felt improved with GI cocktail here in the emergency department.  He has previously had a cholecystectomy and I do not suspect  retained ductal stone although he has some mild elevation of his LFTs.  Lipase within normal limits.  He has a relatively benign abdominal examination and certainly no right lower quadrant tenderness to palpation.  Tolerating p.o. here in the emergency department.    Discussed with patient CT scan imaging at this time or return if he has progression of his symptoms.  He elects to return if he has progression of symptoms for imaging at that time as opposed to now.  Certainly if he is pain moves to the right lower quadrant he is encouraged to return as this may be early appendicitis.  If he develops fever, vomiting, worsening pain he should return.  Atypical cardiac symptoms were considered, however EKG without evidence of MI and troponin negative.  I will not therefore admit him for serial exams and further workup.  Patient is hemodynamically stable in ED. Plan is home with abdominal pain recheck by primary care physician or return to ED at anytime.  Return for fevers greater than 102, increasing pain, other new symptoms develop.  Abdominal pain handout given.  Questions were answered.         Diagnosis:    ICD-10-CM    1. Abdominal pain, epigastric  R10.13        Discharge Medications:  New Prescriptions    No medications on file       Scribe Disclosure:  I, Stan Marin, am serving as a scribe at 7:45 AM on 4/1/2021 to document services personally performed by Tootie Teresa MD based on my observations and the provider's statements to me.          Tootie Teresa MD  04/01/21 8324

## 2021-04-01 NOTE — TELEPHONE ENCOUNTER
"Today Jorge L has a stomach ache and now is having pressure in upper stomach area.  Jorge L has a hernia and it feels like it is pushing.  Jorge L had a bowel movement and that did not help.  Denies injury.  Symptoms started this morning and patient left work.   Pain is currently a \"7\".      COVID 19 Nurse Triage Plan/Patient Instructions    Please be aware that novel coronavirus (COVID-19) may be circulating in the community. If you develop symptoms such as fever, cough, or SOB or if you have concerns about the presence of another infection including coronavirus (COVID-19), please contact your health care provider or visit [email or web address].     Disposition/Instructions    ED Visit recommended. Follow protocol based instructions.     Bring Your Own Device:  Please also bring your smart device(s) (smart phones, tablets, laptops) and their charging cables for your personal use and to communicate with your care team during your visit.    Thank you for taking steps to prevent the spread of this virus.  o Limit your contact with others.  o Wear a simple mask to cover your cough.  o Wash your hands well and often.    Resources    [facility]: About COVID-19: [email or web address]    CDC: What to Do If You're Sick: www.cdc.gov/coronavirus/2019-ncov/about/steps-when-sick.html    CDC: Ending Home Isolation: www.cdc.gov/coronavirus/2019-ncov/hcp/disposition-in-home-patients.html     CDC: Caring for Someone: www.cdc.gov/coronavirus/2019-ncov/if-you-are-sick/care-for-someone.html     [facility]: Interim Guidance for Hospital Discharge to Home: www.health.[facility].[place]/diseases/coronavirus/hcp/hospdischarge.pdf    [facility] clinical trials (COVID-19 research studies): [email or web address]/um-[email or web address]-trials     Below are the COVID-19 hotlines at the [place] Department of Health ([facility]). Interpreters are available.   o For health questions: Call 074-211-9699 or 1-614.503.3100 (7 a.m. to 7 p.m.)  o For " questions about schools and childcare: Call 324-250-4367 or 1-119.454.1018 (7 a.m. to 7 p.m.)                       Reason for Disposition    SEVERE abdominal pain (e.g., excruciating)    Additional Information    Negative: Passed out (i.e., fainted, collapsed and was not responding)    Negative: Shock suspected (e.g., cold/pale/clammy skin, too weak to stand, low BP, rapid pulse)    Negative: Visible sweat on face or sweat is dripping down    Protocols used: ABDOMINAL PAIN - UPPER-A-OH

## 2021-04-05 ENCOUNTER — HOSPITAL ENCOUNTER (INPATIENT)
Facility: CLINIC | Age: 41
LOS: 3 days | Discharge: HOME | End: 2021-04-08
Attending: EMERGENCY MEDICINE | Admitting: INTERNAL MEDICINE
Payer: COMMERCIAL

## 2021-04-05 ENCOUNTER — APPOINTMENT (OUTPATIENT)
Dept: MRI IMAGING | Facility: CLINIC | Age: 41
End: 2021-04-05
Attending: INTERNAL MEDICINE
Payer: COMMERCIAL

## 2021-04-05 ENCOUNTER — APPOINTMENT (OUTPATIENT)
Dept: CT IMAGING | Facility: CLINIC | Age: 41
End: 2021-04-05
Attending: EMERGENCY MEDICINE
Payer: COMMERCIAL

## 2021-04-05 DIAGNOSIS — K85.90 ACUTE PANCREATITIS WITHOUT INFECTION OR NECROSIS, UNSPECIFIED PANCREATITIS TYPE: ICD-10-CM

## 2021-04-05 LAB
ALBUMIN SERPL-MCNC: 3.5 G/DL (ref 3.4–5)
ALBUMIN SERPL-MCNC: 3.5 G/DL (ref 3.4–5)
ALBUMIN SERPL-MCNC: 3.8 G/DL (ref 3.4–5)
ALP SERPL-CCNC: 151 U/L (ref 40–150)
ALP SERPL-CCNC: 156 U/L (ref 40–150)
ALP SERPL-CCNC: 163 U/L (ref 40–150)
ALT SERPL W P-5'-P-CCNC: 1191 U/L (ref 0–70)
ALT SERPL W P-5'-P-CCNC: 1258 U/L (ref 0–70)
ALT SERPL W P-5'-P-CCNC: 1262 U/L (ref 0–70)
ANION GAP SERPL CALCULATED.3IONS-SCNC: 4 MMOL/L (ref 3–14)
ANION GAP SERPL CALCULATED.3IONS-SCNC: 8 MMOL/L (ref 3–14)
APAP SERPL-MCNC: <2 MG/L (ref 10–20)
AST SERPL W P-5'-P-CCNC: 443 U/L (ref 0–45)
AST SERPL W P-5'-P-CCNC: 531 U/L (ref 0–45)
AST SERPL W P-5'-P-CCNC: 599 U/L (ref 0–45)
BASOPHILS # BLD AUTO: 0.1 10E9/L (ref 0–0.2)
BASOPHILS NFR BLD AUTO: 0.5 %
BILIRUB DIRECT SERPL-MCNC: 2.4 MG/DL (ref 0–0.2)
BILIRUB SERPL-MCNC: 2.7 MG/DL (ref 0.2–1.3)
BILIRUB SERPL-MCNC: 3.4 MG/DL (ref 0.2–1.3)
BILIRUB SERPL-MCNC: 3.5 MG/DL (ref 0.2–1.3)
BUN SERPL-MCNC: 13 MG/DL (ref 7–30)
BUN SERPL-MCNC: 15 MG/DL (ref 7–30)
CALCIUM SERPL-MCNC: 8.5 MG/DL (ref 8.5–10.1)
CALCIUM SERPL-MCNC: 9.3 MG/DL (ref 8.5–10.1)
CHLORIDE SERPL-SCNC: 105 MMOL/L (ref 94–109)
CHLORIDE SERPL-SCNC: 106 MMOL/L (ref 94–109)
CHOLEST SERPL-MCNC: 175 MG/DL
CO2 SERPL-SCNC: 24 MMOL/L (ref 20–32)
CO2 SERPL-SCNC: 26 MMOL/L (ref 20–32)
CREAT SERPL-MCNC: 1.11 MG/DL (ref 0.66–1.25)
CREAT SERPL-MCNC: 1.29 MG/DL (ref 0.66–1.25)
DIFFERENTIAL METHOD BLD: ABNORMAL
EOSINOPHIL # BLD AUTO: 0.4 10E9/L (ref 0–0.7)
EOSINOPHIL NFR BLD AUTO: 2.9 %
ERYTHROCYTE [DISTWIDTH] IN BLOOD BY AUTOMATED COUNT: 12.9 % (ref 10–15)
GFR SERPL CREATININE-BSD FRML MDRD: 69 ML/MIN/{1.73_M2}
GFR SERPL CREATININE-BSD FRML MDRD: 83 ML/MIN/{1.73_M2}
GLUCOSE SERPL-MCNC: 145 MG/DL (ref 70–99)
GLUCOSE SERPL-MCNC: 145 MG/DL (ref 70–99)
HAV IGM SERPL QL IA: NONREACTIVE
HBV CORE IGM SERPL QL IA: NONREACTIVE
HCT VFR BLD AUTO: 52.8 % (ref 40–53)
HCV AB SERPL QL IA: NONREACTIVE
HDLC SERPL-MCNC: 30 MG/DL
HGB BLD-MCNC: 18.2 G/DL (ref 13.3–17.7)
IMM GRANULOCYTES # BLD: 0.1 10E9/L (ref 0–0.4)
IMM GRANULOCYTES NFR BLD: 0.9 %
INR PPP: 0.91 (ref 0.86–1.14)
LABORATORY COMMENT REPORT: NORMAL
LACTATE BLD-SCNC: 2.7 MMOL/L (ref 0.7–2)
LDLC SERPL CALC-MCNC: 111 MG/DL
LIPASE SERPL-CCNC: ABNORMAL U/L (ref 73–393)
LYMPHOCYTES # BLD AUTO: 3.7 10E9/L (ref 0.8–5.3)
LYMPHOCYTES NFR BLD AUTO: 24 %
MCH RBC QN AUTO: 30.4 PG (ref 26.5–33)
MCHC RBC AUTO-ENTMCNC: 34.5 G/DL (ref 31.5–36.5)
MCV RBC AUTO: 88 FL (ref 78–100)
MONOCYTES # BLD AUTO: 1.4 10E9/L (ref 0–1.3)
MONOCYTES NFR BLD AUTO: 9.4 %
NEUTROPHILS # BLD AUTO: 9.6 10E9/L (ref 1.6–8.3)
NEUTROPHILS NFR BLD AUTO: 62.3 %
NONHDLC SERPL-MCNC: 145 MG/DL
NRBC # BLD AUTO: 0 10*3/UL
NRBC BLD AUTO-RTO: 0 /100
PLATELET # BLD AUTO: 369 10E9/L (ref 150–450)
POTASSIUM SERPL-SCNC: 3.5 MMOL/L (ref 3.4–5.3)
POTASSIUM SERPL-SCNC: 4 MMOL/L (ref 3.4–5.3)
PROT SERPL-MCNC: 7.4 G/DL (ref 6.8–8.8)
PROT SERPL-MCNC: 7.5 G/DL (ref 6.8–8.8)
PROT SERPL-MCNC: 7.9 G/DL (ref 6.8–8.8)
RBC # BLD AUTO: 5.98 10E12/L (ref 4.4–5.9)
SARS-COV-2 RNA RESP QL NAA+PROBE: NEGATIVE
SODIUM SERPL-SCNC: 136 MMOL/L (ref 133–144)
SODIUM SERPL-SCNC: 137 MMOL/L (ref 133–144)
SPECIMEN SOURCE: NORMAL
TRIGL SERPL-MCNC: 172 MG/DL
WBC # BLD AUTO: 15.3 10E9/L (ref 4–11)

## 2021-04-05 PROCEDURE — 258N000003 HC RX IP 258 OP 636: Performed by: EMERGENCY MEDICINE

## 2021-04-05 PROCEDURE — 255N000002 HC RX 255 OP 636: Performed by: INTERNAL MEDICINE

## 2021-04-05 PROCEDURE — 250N000011 HC RX IP 250 OP 636: Performed by: EMERGENCY MEDICINE

## 2021-04-05 PROCEDURE — 86803 HEPATITIS C AB TEST: CPT | Performed by: PHYSICIAN ASSISTANT

## 2021-04-05 PROCEDURE — 80143 DRUG ASSAY ACETAMINOPHEN: CPT | Performed by: INTERNAL MEDICINE

## 2021-04-05 PROCEDURE — 86709 HEPATITIS A IGM ANTIBODY: CPT | Performed by: PHYSICIAN ASSISTANT

## 2021-04-05 PROCEDURE — 36415 COLL VENOUS BLD VENIPUNCTURE: CPT | Performed by: PHYSICIAN ASSISTANT

## 2021-04-05 PROCEDURE — 258N000003 HC RX IP 258 OP 636: Performed by: INTERNAL MEDICINE

## 2021-04-05 PROCEDURE — 99285 EMERGENCY DEPT VISIT HI MDM: CPT | Mod: 25

## 2021-04-05 PROCEDURE — 83605 ASSAY OF LACTIC ACID: CPT | Performed by: EMERGENCY MEDICINE

## 2021-04-05 PROCEDURE — 83690 ASSAY OF LIPASE: CPT | Performed by: EMERGENCY MEDICINE

## 2021-04-05 PROCEDURE — 80143 DRUG ASSAY ACETAMINOPHEN: CPT | Performed by: EMERGENCY MEDICINE

## 2021-04-05 PROCEDURE — 80061 LIPID PANEL: CPT | Performed by: INTERNAL MEDICINE

## 2021-04-05 PROCEDURE — A9585 GADOBUTROL INJECTION: HCPCS | Performed by: INTERNAL MEDICINE

## 2021-04-05 PROCEDURE — 74183 MRI ABD W/O CNTR FLWD CNTR: CPT

## 2021-04-05 PROCEDURE — 86645 CMV ANTIBODY IGM: CPT | Performed by: PHYSICIAN ASSISTANT

## 2021-04-05 PROCEDURE — 74177 CT ABD & PELVIS W/CONTRAST: CPT

## 2021-04-05 PROCEDURE — 80053 COMPREHEN METABOLIC PANEL: CPT | Performed by: INTERNAL MEDICINE

## 2021-04-05 PROCEDURE — 250N000009 HC RX 250: Performed by: EMERGENCY MEDICINE

## 2021-04-05 PROCEDURE — 80053 COMPREHEN METABOLIC PANEL: CPT | Performed by: EMERGENCY MEDICINE

## 2021-04-05 PROCEDURE — 87040 BLOOD CULTURE FOR BACTERIA: CPT | Performed by: EMERGENCY MEDICINE

## 2021-04-05 PROCEDURE — 85025 COMPLETE CBC W/AUTO DIFF WBC: CPT | Performed by: EMERGENCY MEDICINE

## 2021-04-05 PROCEDURE — 86665 EPSTEIN-BARR CAPSID VCA: CPT | Performed by: PHYSICIAN ASSISTANT

## 2021-04-05 PROCEDURE — 80076 HEPATIC FUNCTION PANEL: CPT | Performed by: PHYSICIAN ASSISTANT

## 2021-04-05 PROCEDURE — 96361 HYDRATE IV INFUSION ADD-ON: CPT

## 2021-04-05 PROCEDURE — 96375 TX/PRO/DX INJ NEW DRUG ADDON: CPT

## 2021-04-05 PROCEDURE — 96365 THER/PROPH/DIAG IV INF INIT: CPT | Mod: 59

## 2021-04-05 PROCEDURE — 99223 1ST HOSP IP/OBS HIGH 75: CPT | Mod: AI | Performed by: INTERNAL MEDICINE

## 2021-04-05 PROCEDURE — 36415 COLL VENOUS BLD VENIPUNCTURE: CPT | Performed by: INTERNAL MEDICINE

## 2021-04-05 PROCEDURE — 120N000001 HC R&B MED SURG/OB

## 2021-04-05 PROCEDURE — 85610 PROTHROMBIN TIME: CPT | Performed by: PHYSICIAN ASSISTANT

## 2021-04-05 PROCEDURE — 250N000011 HC RX IP 250 OP 636: Performed by: INTERNAL MEDICINE

## 2021-04-05 PROCEDURE — C9803 HOPD COVID-19 SPEC COLLECT: HCPCS

## 2021-04-05 PROCEDURE — 87635 SARS-COV-2 COVID-19 AMP PRB: CPT | Performed by: EMERGENCY MEDICINE

## 2021-04-05 PROCEDURE — 96376 TX/PRO/DX INJ SAME DRUG ADON: CPT

## 2021-04-05 PROCEDURE — 86705 HEP B CORE ANTIBODY IGM: CPT | Performed by: PHYSICIAN ASSISTANT

## 2021-04-05 RX ORDER — ONDANSETRON 4 MG/1
4 TABLET, ORALLY DISINTEGRATING ORAL EVERY 6 HOURS PRN
Status: DISCONTINUED | OUTPATIENT
Start: 2021-04-05 | End: 2021-04-08 | Stop reason: HOSPADM

## 2021-04-05 RX ORDER — LABETALOL HYDROCHLORIDE 5 MG/ML
10 INJECTION, SOLUTION INTRAVENOUS EVERY 4 HOURS PRN
Status: DISCONTINUED | OUTPATIENT
Start: 2021-04-05 | End: 2021-04-05

## 2021-04-05 RX ORDER — MORPHINE SULFATE 2 MG/ML
2-4 INJECTION, SOLUTION INTRAMUSCULAR; INTRAVENOUS
Status: DISCONTINUED | OUTPATIENT
Start: 2021-04-05 | End: 2021-04-05

## 2021-04-05 RX ORDER — PROCHLORPERAZINE MALEATE 5 MG
10 TABLET ORAL EVERY 6 HOURS PRN
Status: DISCONTINUED | OUTPATIENT
Start: 2021-04-05 | End: 2021-04-08 | Stop reason: HOSPADM

## 2021-04-05 RX ORDER — LABETALOL HYDROCHLORIDE 5 MG/ML
10 INJECTION, SOLUTION INTRAVENOUS ONCE
Status: COMPLETED | OUTPATIENT
Start: 2021-04-05 | End: 2021-04-05

## 2021-04-05 RX ORDER — SODIUM CHLORIDE 9 MG/ML
INJECTION, SOLUTION INTRAVENOUS CONTINUOUS
Status: DISCONTINUED | OUTPATIENT
Start: 2021-04-05 | End: 2021-04-08 | Stop reason: HOSPADM

## 2021-04-05 RX ORDER — BISACODYL 5 MG
15 TABLET, DELAYED RELEASE (ENTERIC COATED) ORAL DAILY PRN
Status: DISCONTINUED | OUTPATIENT
Start: 2021-04-05 | End: 2021-04-08 | Stop reason: HOSPADM

## 2021-04-05 RX ORDER — NALOXONE HYDROCHLORIDE 0.4 MG/ML
0.2 INJECTION, SOLUTION INTRAMUSCULAR; INTRAVENOUS; SUBCUTANEOUS
Status: DISCONTINUED | OUTPATIENT
Start: 2021-04-05 | End: 2021-04-08 | Stop reason: HOSPADM

## 2021-04-05 RX ORDER — LIDOCAINE 40 MG/G
CREAM TOPICAL
Status: DISCONTINUED | OUTPATIENT
Start: 2021-04-05 | End: 2021-04-08 | Stop reason: HOSPADM

## 2021-04-05 RX ORDER — NALOXONE HYDROCHLORIDE 0.4 MG/ML
0.4 INJECTION, SOLUTION INTRAMUSCULAR; INTRAVENOUS; SUBCUTANEOUS
Status: DISCONTINUED | OUTPATIENT
Start: 2021-04-05 | End: 2021-04-08 | Stop reason: HOSPADM

## 2021-04-05 RX ORDER — IOPAMIDOL 755 MG/ML
500 INJECTION, SOLUTION INTRAVASCULAR ONCE
Status: COMPLETED | OUTPATIENT
Start: 2021-04-05 | End: 2021-04-05

## 2021-04-05 RX ORDER — ONDANSETRON 2 MG/ML
4 INJECTION INTRAMUSCULAR; INTRAVENOUS ONCE
Status: COMPLETED | OUTPATIENT
Start: 2021-04-05 | End: 2021-04-05

## 2021-04-05 RX ORDER — BISACODYL 5 MG
10 TABLET, DELAYED RELEASE (ENTERIC COATED) ORAL DAILY PRN
Status: DISCONTINUED | OUTPATIENT
Start: 2021-04-05 | End: 2021-04-08 | Stop reason: HOSPADM

## 2021-04-05 RX ORDER — HYDROMORPHONE HYDROCHLORIDE 1 MG/ML
0.5 INJECTION, SOLUTION INTRAMUSCULAR; INTRAVENOUS; SUBCUTANEOUS
Status: DISCONTINUED | OUTPATIENT
Start: 2021-04-05 | End: 2021-04-05

## 2021-04-05 RX ORDER — PROCHLORPERAZINE 25 MG
25 SUPPOSITORY, RECTAL RECTAL EVERY 12 HOURS PRN
Status: DISCONTINUED | OUTPATIENT
Start: 2021-04-05 | End: 2021-04-08 | Stop reason: HOSPADM

## 2021-04-05 RX ORDER — AMLODIPINE BESYLATE 5 MG/1
5 TABLET ORAL DAILY
COMMUNITY
End: 2021-11-04

## 2021-04-05 RX ORDER — LABETALOL HYDROCHLORIDE 5 MG/ML
20 INJECTION, SOLUTION INTRAVENOUS EVERY 4 HOURS PRN
Status: DISCONTINUED | OUTPATIENT
Start: 2021-04-05 | End: 2021-04-08 | Stop reason: HOSPADM

## 2021-04-05 RX ORDER — LABETALOL HYDROCHLORIDE 5 MG/ML
20 INJECTION, SOLUTION INTRAVENOUS EVERY 4 HOURS PRN
Status: DISCONTINUED | OUTPATIENT
Start: 2021-04-05 | End: 2021-04-05

## 2021-04-05 RX ORDER — BISACODYL 5 MG
5 TABLET, DELAYED RELEASE (ENTERIC COATED) ORAL DAILY PRN
Status: DISCONTINUED | OUTPATIENT
Start: 2021-04-05 | End: 2021-04-08 | Stop reason: HOSPADM

## 2021-04-05 RX ORDER — GADOBUTROL 604.72 MG/ML
15 INJECTION INTRAVENOUS ONCE
Status: COMPLETED | OUTPATIENT
Start: 2021-04-05 | End: 2021-04-05

## 2021-04-05 RX ORDER — ACETAMINOPHEN 325 MG/1
650 TABLET ORAL EVERY 4 HOURS PRN
Status: DISCONTINUED | OUTPATIENT
Start: 2021-04-05 | End: 2021-04-08 | Stop reason: HOSPADM

## 2021-04-05 RX ORDER — ONDANSETRON 2 MG/ML
4 INJECTION INTRAMUSCULAR; INTRAVENOUS EVERY 6 HOURS PRN
Status: DISCONTINUED | OUTPATIENT
Start: 2021-04-05 | End: 2021-04-08 | Stop reason: HOSPADM

## 2021-04-05 RX ADMIN — SODIUM CHLORIDE, PRESERVATIVE FREE: 5 INJECTION INTRAVENOUS at 23:46

## 2021-04-05 RX ADMIN — LABETALOL HYDROCHLORIDE 10 MG: 5 INJECTION, SOLUTION INTRAVENOUS at 14:17

## 2021-04-05 RX ADMIN — ONDANSETRON 4 MG: 2 INJECTION INTRAMUSCULAR; INTRAVENOUS at 01:57

## 2021-04-05 RX ADMIN — MORPHINE SULFATE 4 MG: 2 INJECTION, SOLUTION INTRAMUSCULAR; INTRAVENOUS at 16:35

## 2021-04-05 RX ADMIN — SODIUM CHLORIDE, PRESERVATIVE FREE: 5 INJECTION INTRAVENOUS at 05:32

## 2021-04-05 RX ADMIN — TAZOBACTAM SODIUM AND PIPERACILLIN SODIUM 4.5 G: 500; 4 INJECTION, SOLUTION INTRAVENOUS at 03:09

## 2021-04-05 RX ADMIN — MORPHINE SULFATE 4 MG: 2 INJECTION, SOLUTION INTRAMUSCULAR; INTRAVENOUS at 07:33

## 2021-04-05 RX ADMIN — Medication: at 18:40

## 2021-04-05 RX ADMIN — SODIUM CHLORIDE, PRESERVATIVE FREE: 5 INJECTION INTRAVENOUS at 18:48

## 2021-04-05 RX ADMIN — MORPHINE SULFATE 4 MG: 2 INJECTION, SOLUTION INTRAMUSCULAR; INTRAVENOUS at 10:04

## 2021-04-05 RX ADMIN — LABETALOL HYDROCHLORIDE 20 MG: 5 INJECTION, SOLUTION INTRAVENOUS at 17:36

## 2021-04-05 RX ADMIN — SODIUM CHLORIDE 1000 ML: 9 INJECTION, SOLUTION INTRAVENOUS at 01:56

## 2021-04-05 RX ADMIN — HYDROMORPHONE HYDROCHLORIDE 0.5 MG: 1 INJECTION, SOLUTION INTRAMUSCULAR; INTRAVENOUS; SUBCUTANEOUS at 03:00

## 2021-04-05 RX ADMIN — GADOBUTROL 14 ML: 604.72 INJECTION INTRAVENOUS at 09:30

## 2021-04-05 RX ADMIN — MORPHINE SULFATE 4 MG: 2 INJECTION, SOLUTION INTRAMUSCULAR; INTRAVENOUS at 14:17

## 2021-04-05 RX ADMIN — SODIUM CHLORIDE, PRESERVATIVE FREE: 5 INJECTION INTRAVENOUS at 11:22

## 2021-04-05 RX ADMIN — SODIUM CHLORIDE, PRESERVATIVE FREE: 5 INJECTION INTRAVENOUS at 16:39

## 2021-04-05 RX ADMIN — IOPAMIDOL 97 ML: 755 INJECTION, SOLUTION INTRAVENOUS at 03:18

## 2021-04-05 RX ADMIN — HYDROMORPHONE HYDROCHLORIDE 0.5 MG: 1 INJECTION, SOLUTION INTRAMUSCULAR; INTRAVENOUS; SUBCUTANEOUS at 04:18

## 2021-04-05 RX ADMIN — MORPHINE SULFATE 2 MG: 2 INJECTION, SOLUTION INTRAMUSCULAR; INTRAVENOUS at 05:34

## 2021-04-05 RX ADMIN — LABETALOL HYDROCHLORIDE 10 MG: 5 INJECTION, SOLUTION INTRAVENOUS at 10:00

## 2021-04-05 RX ADMIN — SODIUM CHLORIDE 65 ML: 9 INJECTION, SOLUTION INTRAVENOUS at 03:18

## 2021-04-05 RX ADMIN — MORPHINE SULFATE 4 MG: 2 INJECTION, SOLUTION INTRAMUSCULAR; INTRAVENOUS at 12:01

## 2021-04-05 RX ADMIN — SODIUM CHLORIDE 1000 ML: 9 INJECTION, SOLUTION INTRAVENOUS at 03:00

## 2021-04-05 ASSESSMENT — ENCOUNTER SYMPTOMS
DIARRHEA: 0
ABDOMINAL PAIN: 1
BLOOD IN STOOL: 0
VOMITING: 1
ANAL BLEEDING: 0
DIFFICULTY URINATING: 0
NAUSEA: 1
CONSTIPATION: 0
DYSURIA: 0
FEVER: 0

## 2021-04-05 ASSESSMENT — ACTIVITIES OF DAILY LIVING (ADL)
ADLS_ACUITY_SCORE: 16
ADLS_ACUITY_SCORE: 14
ADLS_ACUITY_SCORE: 16
ADLS_ACUITY_SCORE: 14

## 2021-04-05 NOTE — ED TRIAGE NOTES
Epigastric abd pain x 3 days.  Seen at [facility] ER and had unremarkable work up.   Told to return for CT scan if pain worsened.  Pain is worse and pt has been vomiting.  Pt jaundiced and diaphoretic in triage.

## 2021-04-05 NOTE — H&P
History and Physical     Junito Coelho MRN# [id number]   YOB: 1980 Age: 40 year old      Date of Admission:  4/5/2021    Primary care provider: Rosa Maldonado          Assessment and Plan:     Summary of Stay: Junito Coelho is a 40 year old male with a history of morbid obesity, and htn,  admitted on 4/5/2021 with accelerating abdominal pain and found to have acute pancreatitis with severely elevated LFTs    His pain was intermittent and began about 4 days ago, he actually came into the ER at that time.  Work up notable for mild transamitis, lipase 130.    He actually felt well for the past 2 days until last night when he felt like it was starting again.  IT was quite severe and so came back to the ER    VS notable for hypertension, but he was afebrile. Labs impressive with lipase of 50 K, severely elevated LFTs ALT 1258, , Alk phos/jolanta 163/3.5    He had a cholecystectomy a few years ago, he binge drinks on weekends stating he had 12 coors beers on saturdays.  CT confirms pancreatitis    Problem List:   Pancreatitis  Could still be etoh related based on beer consumption last Saturday, but concern is also for stone with severely elevated LFTs (although not in a classic pattern for docholithiasis OR etoh)  -NPO/pain and emesis control with IV medications, treat with IVF - recd 2.5 L in the ER  -MRCP  -formal GI consultation    Severely elevated LFTs in an unusual pattern  -ck tylenol level  -repeat this afternoon    NBA  Should improve with IVF, recheck this afternoon    htn cont home regimen when med rec complete    Obesity  Complicates cares      DVT Prophylaxis: Pneumatic Compression Devices  Code Status: Full Code  Functional Status:  independent  Moran:  Not needed  Access:  PIV              Chief Complaint:     Abdominal pain        History of Present Illness:   Junito Coelho is a 40 year old male with a history of morbid obesity, and htn,  admitted on 4/5/2021 with  accelerating abdominal pain and found to have acute pancreatitis with severely elevated LFTs    His pain was intermittent and began about 4 days ago, he actually came into the ER at that time.  Work up notable for mild transamitis, lipase 130.    He actually felt well for the past 2 days until last night when he felt like it was starting again.  IT was quite severe and so came back to the ER    VS notable for hypertension, but he was afebrile. Labs impressive with lipase of 50 K, severely elevated LFTs ALT 1258, , Alk phos/jolanta 163/3.5    He had a cholecystectomy a few years ago, he binge drinks on weekends stating he had 12 coors beers on saturdays.  CT confirms pancreatitis      The history is obtained in discussion with the ER provider Dr Richardson and the patient with good reliability         Past Medical History:     Past Medical History:   Diagnosis Date     Abdominal pain      Benign essential hypertension      Gastro-oesophageal reflux disease      Morbid obesity with BMI of 40.0-44.9, adult (H)     BMI 44             Past Surgical History:     Past Surgical History:   Procedure Laterality Date     LAPAROSCOPIC CHOLECYSTECTOMY N/A 6/19/2015    Procedure: LAPAROSCOPIC CHOLECYSTECTOMY;  Surgeon: John Yepez MD;  Location: [facility]     ORTHOPEDIC SURGERY      right hand             Social History:     Social History     Tobacco Use     Smoking status: Current Some Day Smoker     Packs/day: 0.50     Years: 15.00     Pack years: 7.50     Smokeless tobacco: Never Used   Substance Use Topics     Alcohol use: No     Alcohol/week: 0.0 standard drinks             Family History:     Family History   Problem Relation Age of Onset     Cerebrovascular Disease Mother 62        acute stroke with residual left sided hemiparesis     Hypertension Father      Coronary Artery Disease Father 56        heart attack at 56     Family History Negative Sister      Family History Negative Sister      Family History  Negative Sister      Family History Negative Sister             Allergies:   No Known Allergies          Medications:     Awaiting med rec          Review of Systems:     A Comprehensive greater than 10 system review of systems was carried out.  Pertinent positives and negatives are noted above.  Otherwise negative for contributory information.           Physical Exam:   Blood pressure (!) 185/117, pulse 90, temperature 97.8  F (36.6  C), temperature source Temporal, resp. rate 18, weight 145.9 kg (321 lb 9.6 oz), SpO2 93 %.  Exam:    General:  Pleasant nad looks stated age  HEENT:  Head nc/at sclera clear PERRL O/P:  Mask in place.  Neck is supple  Lungs: cta b nl effort   CV:  RRR no m/r/g no le edema  Abd:  Moderately distended, has diffuse ttp worse in epigastric region  Neuro:  Cn 2-12 grossly intact and reina  Alert and oriented affect appropriate   Skin:  W/d no c/c               Data:          Lab Results   Component Value Date     04/05/2021    Lab Results   Component Value Date    CHLORIDE 105 04/05/2021    Lab Results   Component Value Date    BUN 15 04/05/2021      Lab Results   Component Value Date    POTASSIUM 3.5 04/05/2021    Lab Results   Component Value Date    CO2 24 04/05/2021    Lab Results   Component Value Date    CR 1.29 04/05/2021        Lab Results   Component Value Date    WBC 15.3 (H) 04/05/2021    HGB 18.2 (H) 04/05/2021    HCT 52.8 04/05/2021    MCV 88 04/05/2021     04/05/2021     Lab Results   Component Value Date     (H) 04/05/2021     Lab Results   Component Value Date     (HH) 04/05/2021    ALT 1,258 (HH) 04/05/2021    ALKPHOS 163 (H) 04/05/2021    BILITOTAL 3.4 (H) 04/05/2021            Imaging:     Recent Results (from the past 24 hour(s))   CT Abdomen Pelvis w Contrast    Narrative    EXAM: CT ABDOMEN PELVIS W CONTRAST  LOCATION: [facility]  DATE/TIME: 4/5/2021 3:13 AM    INDICATION: Abdominal distension  Epigastric pain  COMPARISON:  Limited abdominal ultrasound 06/14/2015.  TECHNIQUE: CT scan of the abdomen and pelvis was performed following injection of IV contrast. Multiplanar reformats were obtained. Dose reduction techniques were used.  CONTRAST: 97mL Isovue-370    FINDINGS:   LOWER CHEST: Normal.    HEPATOBILIARY: Enlarged fatty liver. Postcholecystectomy. No biliary dilatation.    PANCREAS: Moderate inflammation of the pancreas. Small amount of fluid tracks inferiorly from the pancreas in the retroperitoneum. There appears to be homogeneous enhancement of the pancreatic parenchyma. Normal caliber pancreatic duct.    SPLEEN: Normal.    ADRENAL GLANDS: Normal.    KIDNEYS/BLADDER: Normal.    BOWEL: Normal. No obstruction or inflammation. Normal appendix.    LYMPH NODES: Normal.    VASCULATURE: Unremarkable.    PELVIC ORGANS: Normal.    MUSCULOSKELETAL: Small to moderate-sized fat-containing periumbilical hernia. Small amount of fat in the right inguinal canal.      Impression    IMPRESSION:   1.  Acute pancreatitis.  2.  Hepatic steatosis.

## 2021-04-05 NOTE — CONSULTS
GASTROENTEROLOGY CONSULTATION      Junito Coelho  [address] [facility] [address]  40 year old male     Admission Date/Time: 4/5/2021  Primary Care Provider: Rosa Maldonado  Referring / Attending Physician:  Dr. Thomas     We were asked to see the patient in consultation by Dr. Thomsa for evaluation of pancreatitis.        HPI:  Junito Coelho is a 40 year old male with medical history of hypertension, cholecystectomy, obesity, and alcohol abuse who presents to the hospital with worsening abdominal pain.    Patient reports his abdominal pain began intermittently about 5 days ago.  He reports sharp epigastric pain that would resolve spontaneously.  He thought it was heartburn.  Last night the pain became so severe he had to come to the emergency room for management.  He notes he did go to the emergency room 2 days ago and was sent home after a lipase was 130 and his transaminases were mildly elevated.  Patient does admit that he binge drinks on the weekends.  He drinks 12 beers about 3 days ago.    CT scan of the abdomen and pelvis shows acute pancreatitis.  There is no biliary dilation.  There is mention of an enlarged fatty liver.  Lipase on admission was 51,000, , ALT 1200, alkaline phosphatase 163, and total bilirubin 3.4.  The patient's creatinine was 1.29.  Patient denies any use of IV or recreational drugs.  No history of chronic liver disease.  He denies any family history of liver disease.  He did have his gallbladder removed a number of years ago.       PAST MEDICAL HISTORY:  Patient Active Problem List    Diagnosis Date Noted     Acute pancreatitis without infection or necrosis, unspecified pancreatitis type 04/05/2021     Priority: Medium     Obesity with BMI of 40.0-44.9, adult (H)      Priority: Medium     BMI 44            ROS: A comprehensive ten point review of systems was negative aside from those in mentioned in the HPI.       MEDICATIONS:   Prior to Admission medications     Medication Sig Start Date End Date Taking? Authorizing Provider   amLODIPine (NORVASC) 5 MG tablet TAKE 1 TABLET BY MOUTH TWICE A DAY 2/12/21   Rosa Maldonado MD   lisinopril (ZESTRIL) 20 MG tablet TAKE 1 TABLET BY MOUTH TWICE A DAY 12/7/20   Rosa Maldonado MD        ALLERGIES: No Known Allergies     SOCIAL HISTORY:  Social History     Tobacco Use     Smoking status: Current Some Day Smoker     Packs/day: 0.50     Years: 15.00     Pack years: 7.50     Smokeless tobacco: Never Used   Substance Use Topics     Alcohol use: No     Alcohol/week: 0.0 standard drinks     Drug use: No        FAMILY HISTORY:  Family History   Problem Relation Age of Onset     Cerebrovascular Disease Mother 62        acute stroke with residual left sided hemiparesis     Hypertension Father      Coronary Artery Disease Father 56        heart attack at 56     Family History Negative Sister      Family History Negative Sister      Family History Negative Sister      Family History Negative Sister         PHYSICAL EXAM:     BP (!) 185/117 (BP Location: Right arm)   Pulse 90   Temp 97.8  F (36.6  C) (Temporal)   Resp 18   Wt 145.9 kg (321 lb 9.6 oz)   SpO2 93%   BMI 44.85 kg/m       PHYSICAL EXAM:  GENERAL:  NAD  SKIN: no suspicious lesions, rashes, jaundice  HEAD: Normocephalic. Atraumatic.  NECK: Neck supple. No adenopathy.   EYES: No scleral icterus  GASTROINTESTINAL: obese, soft,diffusely tender throughout, non distended, no guarding/rebound  JOINT/EXTREMITIES:  no gross deformities noted, normal muscle tone  NEURO: CN 2-12 grossly intact, no focal deficits  PSYCH: Normal affect              ADDITIONAL COMMENTS:   I reviewed the patient's new clinical lab test results.   Recent Labs   Lab Test 04/05/21 0156 04/01/21  0829 09/25/20  0953   WBC 15.3* 7.5 8.2   HGB 18.2* 16.2 16.4   MCV 88 86 87    266 292     Recent Labs   Lab Test 04/05/21 0156 04/01/21  0829 11/10/20  1443   POTASSIUM 3.5 3.7  4.0   CHLORIDE 105 104 102   CO2 24 26 27   BUN 15 19 21   ANIONGAP 8 6 7     Recent Labs   Lab Test 04/05/21  0156 04/01/21  0829 09/25/20  0953 06/14/15  1542   ALBUMIN 3.8 3.7 3.6 3.6   BILITOTAL 3.4* 1.0 0.8 1.0   ALT 1,258* 142* 94* 119*   * 138* 50* 122*   LIPASE 51,286* 130  --  426*        IMAGING / ENDOSCOPY     CT ABDOMEN PELVIS W CONTRAST  LOCATION: [facility]  DATE/TIME: 4/5/2021 3:13 AM     FINDINGS:   LOWER CHEST: Normal.     HEPATOBILIARY: Enlarged fatty liver. Postcholecystectomy. No biliary dilatation.     PANCREAS: Moderate inflammation of the pancreas. Small amount of fluid tracks inferiorly from the pancreas in the retroperitoneum. There appears to be homogeneous enhancement of the pancreatic parenchyma. Normal caliber pancreatic duct.     SPLEEN: Normal.     ADRENAL GLANDS: Normal.     KIDNEYS/BLADDER: Normal.     BOWEL: Normal. No obstruction or inflammation. Normal appendix.     LYMPH NODES: Normal.     VASCULATURE: Unremarkable.     PELVIC ORGANS: Normal.     MUSCULOSKELETAL: Small to moderate-sized fat-containing periumbilical hernia. Small amount of fat in the right inguinal canal.                                                                      IMPRESSION:   1.  Acute pancreatitis.  2.  Hepatic steatosis.     CONSULTATION ASSESSMENT AND PLAN:    Junito Coelho is a 40 year old with medical history of hypertension, cholecystectomy, obesity, and alcohol abuse who presents to the hospital with worsening abdominal pain found to have elevated LFTs and pancreatitis.    1.  Acute pancreatitis: Thought secondary to alcohol however given significantly elevated LFTs concern for biliary obstruction.  He has already had his gallbladder removed in the past.  No fluid collections or necrosis noted.  Lipase of 51,000.  --Plan for MRCP today  --Continue aggressive fluid replacement, pain medication, n.p.o.    2.  Elevated LFTs: , ALT 1200, alkaline phosphatase 163, total  bilirubin 3.4.  Unclear etiology given this pattern.  Could be related to alcohol, viral infection, choledocholithiasis, toxin, ischemia. MELD 15   --Check INR, and repeat LFTs this morning  --Tylenol level pending  -- We will check viral hepatitis etiologies.  --Only fatty liver seen on CT scan, without focal lesion identified.  -- Pending MRCP consider doppler US of liver to evaluate for portal thrombus.      I discussed the patient plan with Dr. Daley. Thank you for asking us to participate in the care of this patient.    Barbra Fonseca PA-C  [facility] ( [facility])   ----------------------------------------------  I agree with the assessment and plan of Barbra DIAZ.  The patient has been drinking 12 beers every Saturday for about 1 year now and about once a week he might have an extra beer if a neighbor comes over.  His sister and father both had pancreatitis in their 40s that was attributed to gallstones.  Patient currently with abd pain but reports it is a little bit better since he just got some pain medication.  ON exam he is in distress secondary to pain.  His abd is obese and he has tenderness in the center of the abd with light palpation, breathing is non-labored.    A/P Acute pancreatis with elevated LFTs (AST/ALT, Direct bili).  This would be consistent with possible choledocholithiasis and MRCP is negative, but he may have passed the stone (history of cholecystectomy).  Other possibilities would be a hereditary pancreatitis since both his sister and father had this in their 40s, although it appears this was related to gallstones.  Alcoholic pancreatitis is in the differential as well.  Calcium is normal.  I do not see that TGAs have been checked so I have ordered those.    Repeat LFTs are currently pending.  Agree with viral hepatitis blood work.  Continue aggressive IV hydration and pain management.    We will continue to follow.    Dale Daley MD  [facility]

## 2021-04-05 NOTE — ED PROVIDER NOTES
History   Chief Complaint:  Abdominal Pain    The history is provided by the patient.     Junito Coelho is a 40 year old male, s/p cholecystectomy in 2015, who presents for evaluation of epigastric abdominal pain. He reports the onset of epigastric pain 3-4 days ago. When it first started, it quickly became severe so he presented to the [facility] ED on the first day. There, he underwent an EKG and basic blood work (noted below). He reported improvement with the GI cocktail and, after a discussion about a CT scan, he opted to defer imaging at that time. Since being discharged from that ED encounter, he reports the pain did subside for awhile. Unfortunately, the pain came back more severe at 0100 today, approximately 1.5 hours prior to evaluation. He vomited once at home which he reports was non-bloody emesis. Given the acute worsening, he re-presented to the ED for an imaging study.     Here, he reports the epigastric abdominal pain is still present. He also reports nausea and difficulty breathing secondary to the pain. He denies any urinary symptoms or stool changes. He further denies any lower abdominal pain.  He reports last drinking two evenings ago.     From ED Work Up on 4/1/2021:   ECG  Normal sinus rhythm  Nonspecific intraventricular conduction delay  Borderline ECG   Rate 68 bpm. IA interval 160 ms. QRS duration 122 ms. QT/QTc 392/416 ms. P-R-T axes 49 26 39.      CBC: WBC 7.5, HGB 16.2,   CMP: Glucose: 104 (H), Ca: 8.4 (L), ALT: 142 (H), AST: 138 (H) o/w WNL (Creatinine: 1.18)  Lipase: 130  Troponin: <0.015     Allergies:  No Known Allergies    Medications:    Amlodipine   Lisinopril     Past Medical History:    GERD  Obesity   Hypertension    Past Surgical History:    Cholecystectomy - 2015  Right hand surgery      Family History:    Mother: stroke   Father: hypertension, MI at 56     Social History:  Presents unaccompanied to the ED.   He reports occasional alcohol use; last drink two  nights ago.     Review of Systems   Constitutional: Negative for fever.   Gastrointestinal: Positive for abdominal pain, nausea and vomiting. Negative for anal bleeding, blood in stool, constipation and diarrhea.   Genitourinary: Negative for difficulty urinating and dysuria.   All other systems reviewed and are negative.    Physical Exam     Patient Vitals for the past 24 hrs:   BP Temp Pulse Resp SpO2   04/05/21 0445 (!) 150/84 -- -- 18 92 %   04/05/21 0430 -- -- -- -- 92 %   04/05/21 0400 (!) 179/112 -- 82 -- 96 %   04/05/21 0345 (!) 182/111 -- 84 -- 95 %   04/05/21 0146 (!) 183/109 -- -- -- --   04/05/21 0145 -- 98.5  F (36.9  C) 92 18 99 %      Physical Exam  General: Patient is awake, alert and interactive when I enter the room. Appears uncomfortable.   Head: The scalp, face, and head appear normal  Eyes: The pupils are equal, round, and reactive to light. Conjunctivae and sclerae are normal  Neck: Normal range of motion. No anterior cervical lymphadenopathy noted  CV: Regular rate. S1/S2. No murmurs.   Resp: Lungs are clear without wheezes or rales. No respiratory distress.   GI: Abdomen is soft, no rigidity. No evidence of pulsatile mass. No fluid waves or evidence of ascites. No distension. He is tender to palpation in the epigastrium There is no rebound or guarding. No hernias or bruising are noted in detailed exam. No CVA tenderness.     MS: Normal tone. Joints grossly normal without effusions. No asymmetric leg swelling, calf or thigh tenderness.    Skin: No rash or lesions noted. Normal capillary refill noted  Neuro:Speech is normal and fluent. Face is symmetric. Moving all extremities.   Psych:  Normal affect.  Appropriate interactions.    Emergency Department Course     Imaging:  CT Abdomen Pelvis w Contrast:  1.  Acute pancreatitis.  2.  Hepatic steatosis.  Reading per radiology.      Laboratory:  CBC: WBC 15.3 (H), HGB: 18.20 (H), PLT: 369  CMP: Glucose: 145 (H), Creatinine: 1.29 (H), Bilirubin  total: 3.4 (H), Alkaline phosphatase: 163 (H), ALT: 1258 (HH), AST: 599 (HH), o/w WNL     Lactic acid (Resulted at 0206): 2.7 (H)    Lipase: 51,286 (H)    Blood cultures x2: Pending    Asymptomatic COVID-19 Virus PCR: Pending     Emergency Department Course:    Reviewed:  I reviewed the patient's nursing notes, vitals, past medical records, and Care Everywhere.     Assessments:  0233: I performed an exam of the patient, as documented above. History obtained and plan for ED work up discussed as well.   0350: I reassessed the patient and discussed the results of the work up.     Consults:   0400: I consulted with Dr. Thomas of the hospitalist services. She is in agreement to accept the patient for admission.    Interventions:  0156: NS 1L IV bolus   0157: Zofran, 4 mg, IV injection   0300: NS 1L IV bolus   0300: Dilaudid, 0.5 mg, IV injection   0309: Zosyn, 4.5 g, IV infusion    Disposition:  The patient was admitted to the hospital under the care of Dr. Thomas.     Impression & Plan      Covid-19  Junito Coelho was evaluated during a global COVID-19 pandemic, which necessitated consideration that the patient might be at risk for infection with the SARS-CoV-2 virus that causes COVID-19.   Applicable protocols for evaluation were followed during the patient's care.   COVID-19 was considered as part of the patient's evaluation. The plan for testing is:  a test was obtained during this visit.    Medical Decision Making:   Junito Coelho is a 40 year old male who presents with epigastric abdominal pain.  The differential diagnosis would include GERD, GIB, esophageal spasm, atypical cardiac sx's, pancreatitis, gallstone disease, AAA, gastroenteritis, gastritis, large vs small bowel disease, etc.  Based on history, exam, and labs, the most likely explanation is pancreatitis and CT scan confirms this diagnosis.  Patient does not have any history of significant alcohol use or elevated triglycerides.  He is status post  cholecystectomy.  However I am still concerned about the possibility of retained stone.  I discussed my concerns with Dr. Thomas of the hospitalist team and we will obtain a MRCP to rule out the possibility of retained stone causing gallstone pancreatitis and biliary obstruction.  We will give the patient a dose of Zosyn in the emergency department prophylactically cover him for possible cholangitis.  Pain control in ED was successful however given his elevated LFTs and ongoing significant pain, will plan to admit the patient to medicine for further monitoring, evaluation, and treatment. The case was discussed with Dr. Thomas who agrees to accept the patient for admission. All patient's questions answered and he was admitted in stable condition.     Diagnosis:     ICD-10-CM    1. Acute pancreatitis without infection or necrosis, unspecified pancreatitis type  K85.90 Blood culture     Blood culture     Asymptomatic SARS-CoV-2 COVID-19 Virus (Coronavirus) by PCR     Scribe Disclosure:  I, Ruthie Houston, am serving as a scribe on 4/5/2021 at 2:35 AM to personally document services performed by Reji Richardson MD based on my observations and the provider's statements to me.      4/5/2021   EMERGENCY DEPARTMENT     Reji Richardson MD  04/05/21 0506       Reji Richardson MD  04/05/21 0508

## 2021-04-05 NOTE — PROGRESS NOTES
Chart reviewed and case discussed with Dr. Thomas. Please refer to admission note for details of care plan.     40-year-old male with a past medical history of hypertension, status post cholecystectomy, alcohol abuse, admitted to the hospital with worsening abdominal pain.  Found to have evidence of severe pancreatitis with significant elevation in transaminases as well.  The abnormalities in the ALT/AST seem out of proportion to the reported alcohol use.  Tylenol level is negative.  Appreciate GI consultation.  MRCP obtained today which shows changes of acute pancreatitis without any evidence of necrosis or fluid collection.  No obstructing stone, biliary or pancreatic ductal dilation seen.  Continue aggressive IV hydration, pain control, n.p.o. status.  Might need a PCA for pain control.

## 2021-04-05 NOTE — PHARMACY-ADMISSION MEDICATION HISTORY
Admission medication history interview status for this patient is complete. See [facility] admission navigator for allergy information, prior to admission medications and immunization status.     Medication history interview done, indicate source(s): Patient  Medication history resources (including written lists, pill bottles, clinic record): [employer] dispense records  Pharmacy: [facility]/pharmacy #[id number] [place], [place] - [address] [phone]    Changes made to PTA medication list:  Added: None  Deleted: None  Changed:   1. Amlodipine 5mg BID --> 5mg daily    Actions taken by pharmacist (provider contacted, etc):None     Additional medication history information:None    Medication reconciliation/reorder completed by provider prior to medication history?  No       Prior to Admission medications    Medication Sig Last Dose Taking? Auth Provider   amLODIPine (NORVASC) 5 MG tablet Take 5 mg by mouth daily 4/3/2021 Yes Unknown, Entered By History   lisinopril (ZESTRIL) 20 MG tablet TAKE 1 TABLET BY MOUTH TWICE A DAY 4/3/2021 Yes Rosa Maldonado MD

## 2021-04-05 NOTE — PROVIDER NOTIFICATION
DATE:  4/5/2021   TIME OF RECEIPT FROM LAB:  3075  LAB TEST:  ALT   LAB VALUE:  1191  RESULTS GIVEN WITH READ-BACK TO (PROVIDER):  Dr Zuniga  TIME LAB VALUE REPORTED TO PROVIDER:   6357

## 2021-04-05 NOTE — PLAN OF CARE
Admitted from ER angry that he was having pain. A/O Blood pressure elevated Web based paged  IV morphine given for  Abdominal pain control.  Up independently in room voiding denies nausea NPO Waiting MRI

## 2021-04-05 NOTE — ED NOTES
[facility]  ED Nurse Handoff Report    Junito Coelho is a 40 year old male   ED Chief complaint: Abdominal Pain  . ED Diagnosis:   Final diagnoses:   Acute pancreatitis without infection or necrosis, unspecified pancreatitis type     Allergies: No Known Allergies    Code Status: Full Code  Activity level - Baseline/Home:  Independent. Activity Level - Current:   Independent. Lift room needed: No. Bariatric: No   Needed: No   Isolation: No. Infection: Not Applicable.     Vital Signs:   Vitals:    04/05/21 0145 04/05/21 0146   BP:  (!) 183/109   Pulse: 92    Resp: 18    Temp: 98.5  F (36.9  C)    SpO2: 99%        Cardiac Rhythm:  ,      Pain level:    Patient confused: No. Patient Falls Risk: Yes.   Elimination Status: Has voided   Patient Report - Initial Complaint: Abdomen Pain. Focused Assessment: See Physician Note   Tests Performed: Labs, Xray CT. Abnormal Results: See Reports/CT.   Treatments provided: Fluids, Labs, Antibiotics, Meds  Family Comments: Not present due to Covid  OBS brochure/video discussed/provided to patient:  N/A  ED Medications:   Medications   HYDROmorphone (PF) (DILAUDID) injection 0.5 mg (0.5 mg Intravenous Given 4/5/21 0418)   ondansetron (ZOFRAN) injection 4 mg (4 mg Intravenous Given 4/5/21 0157)   0.9% sodium chloride BOLUS (1,000 mLs Intravenous New Bag 4/5/21 0156)   piperacillin-tazobactam (ZOSYN) intermittent infusion 4.5 g (0 g Intravenous Stopped 4/5/21 0418)   0.9% sodium chloride BOLUS (0 mLs Intravenous Stopped 4/5/21 0418)   iopamidol (ISOVUE-370) solution 500 mL (97 mLs Intravenous Given 4/5/21 0318)   for CT scan flush use (65 mLs Intravenous Given 4/5/21 0318)     Drips infusing:  No  For the majority of the shift, the patient's behavior Green. Interventions performed were None.    Sepsis treatment initiated: No     Patient tested for COVID 19 prior to admission: YES    ED Nurse Name/Phone Number: Melisa Brown RN,   4:28 AM    RECEIVING UNIT ED  HANDOFF REVIEW    Above ED Nurse Handoff Report was reviewed: Yes  Reviewed by: Marilin Briceno RN on April 5, 2021 at 4:45 AM

## 2021-04-06 LAB
ALBUMIN SERPL-MCNC: 3 G/DL (ref 3.4–5)
ALP SERPL-CCNC: 116 U/L (ref 40–150)
ALT SERPL W P-5'-P-CCNC: 729 U/L (ref 0–70)
ANION GAP SERPL CALCULATED.3IONS-SCNC: 4 MMOL/L (ref 3–14)
AST SERPL W P-5'-P-CCNC: 117 U/L (ref 0–45)
BILIRUB SERPL-MCNC: 2 MG/DL (ref 0.2–1.3)
BUN SERPL-MCNC: 12 MG/DL (ref 7–30)
CALCIUM SERPL-MCNC: 8.1 MG/DL (ref 8.5–10.1)
CHLORIDE SERPL-SCNC: 107 MMOL/L (ref 94–109)
CMV IGM SERPL QL IA: <0.2 AI (ref 0–0.8)
CO2 SERPL-SCNC: 27 MMOL/L (ref 20–32)
CREAT SERPL-MCNC: 0.99 MG/DL (ref 0.66–1.25)
EBV VCA IGM SER QL IA: <0.2 AI (ref 0–0.8)
ERYTHROCYTE [DISTWIDTH] IN BLOOD BY AUTOMATED COUNT: 13.3 % (ref 10–15)
GFR SERPL CREATININE-BSD FRML MDRD: >90 ML/MIN/{1.73_M2}
GLUCOSE SERPL-MCNC: 116 MG/DL (ref 70–99)
HCT VFR BLD AUTO: 46.9 % (ref 40–53)
HGB BLD-MCNC: 16 G/DL (ref 13.3–17.7)
LACTATE BLD-SCNC: 1 MMOL/L (ref 0.7–2)
LIPASE SERPL-CCNC: 1746 U/L (ref 73–393)
MCH RBC QN AUTO: 30.7 PG (ref 26.5–33)
MCHC RBC AUTO-ENTMCNC: 34.1 G/DL (ref 31.5–36.5)
MCV RBC AUTO: 90 FL (ref 78–100)
PLATELET # BLD AUTO: 233 10E9/L (ref 150–450)
POTASSIUM SERPL-SCNC: 3.6 MMOL/L (ref 3.4–5.3)
PROT SERPL-MCNC: 6.8 G/DL (ref 6.8–8.8)
RBC # BLD AUTO: 5.21 10E12/L (ref 4.4–5.9)
SODIUM SERPL-SCNC: 138 MMOL/L (ref 133–144)
WBC # BLD AUTO: 12.6 10E9/L (ref 4–11)

## 2021-04-06 PROCEDURE — 85027 COMPLETE CBC AUTOMATED: CPT | Performed by: INTERNAL MEDICINE

## 2021-04-06 PROCEDURE — 80053 COMPREHEN METABOLIC PANEL: CPT | Performed by: INTERNAL MEDICINE

## 2021-04-06 PROCEDURE — 36415 COLL VENOUS BLD VENIPUNCTURE: CPT | Performed by: INTERNAL MEDICINE

## 2021-04-06 PROCEDURE — 120N000001 HC R&B MED SURG/OB

## 2021-04-06 PROCEDURE — 83605 ASSAY OF LACTIC ACID: CPT | Performed by: INTERNAL MEDICINE

## 2021-04-06 PROCEDURE — 83690 ASSAY OF LIPASE: CPT | Performed by: INTERNAL MEDICINE

## 2021-04-06 PROCEDURE — 99233 SBSQ HOSP IP/OBS HIGH 50: CPT | Performed by: INTERNAL MEDICINE

## 2021-04-06 PROCEDURE — 258N000003 HC RX IP 258 OP 636: Performed by: INTERNAL MEDICINE

## 2021-04-06 PROCEDURE — 250N000013 HC RX MED GY IP 250 OP 250 PS 637: Performed by: INTERNAL MEDICINE

## 2021-04-06 RX ADMIN — ACETAMINOPHEN 650 MG: 325 TABLET, FILM COATED ORAL at 17:06

## 2021-04-06 RX ADMIN — SODIUM CHLORIDE, PRESERVATIVE FREE: 5 INJECTION INTRAVENOUS at 06:31

## 2021-04-06 RX ADMIN — SODIUM CHLORIDE, PRESERVATIVE FREE: 5 INJECTION INTRAVENOUS at 20:04

## 2021-04-06 RX ADMIN — ACETAMINOPHEN 650 MG: 325 TABLET, FILM COATED ORAL at 12:13

## 2021-04-06 RX ADMIN — SODIUM CHLORIDE, PRESERVATIVE FREE: 5 INJECTION INTRAVENOUS at 13:12

## 2021-04-06 ASSESSMENT — ACTIVITIES OF DAILY LIVING (ADL)
ADLS_ACUITY_SCORE: 16

## 2021-04-06 NOTE — PROGRESS NOTES
GASTROENTEROLOGY PROGRESS NOTE     SUBJECTIVE:  Had increase in pain last evening, better managed today with PCA. He is needing this less today. Just started sips of clear and tolerating. Had BM today, passing flatus.      OBJECTIVE:    BP (!) 164/102 (BP Location: Right arm)   Pulse 102   Temp 98.5  F (36.9  C) (Temporal)   Resp 16   Wt 145.9 kg (321 lb 9.6 oz)   SpO2 92%   BMI 44.85 kg/m    Temp (24hrs), Av.5  F (37.5  C), Min:98.5  F (36.9  C), Max:101.1  F (38.4  C)    Patient Vitals for the past 72 hrs:   Weight   21 0639 145.9 kg (321 lb 9.6 oz)       Intake/Output Summary (Last 24 hours) at 2021 1158  Last data filed at 2021 1035  Gross per 24 hour   Intake 3951 ml   Output --   Net 3951 ml        PHYSICAL EXAM  Gen: alert, oriented, NAD  Abd: moderately distended, mildly tender diffusely, +BS     Additional Comments:  ROS, FH, SH: See initial GI consult for details.     I have reviewed the patient's new clinical lab results:     Recent Labs   Lab Test 21  0956 21  01521  0829   WBC 12.6*  --  15.3* 7.5   HGB 16.0  --  18.2* 16.2   MCV 90  --  88 86     --  369 266   INR  --  0.91  --   --      Recent Labs   Lab Test 21  11421  0156   POTASSIUM 3.6 4.0 3.5   CHLORIDE 107 106 105   CO2 27 26 24   BUN 12 13 15   ANIONGAP 4 4 8     Recent Labs   Lab Test 21  0956 21  0156 21  0829   ALBUMIN 3.0* 3.5 3.5 3.8 3.7   BILITOTAL 2.0* 2.7* 3.5* 3.4* 1.0   * 1,191* 1,262* 1,258* 142*   * 443* 531* 599* 138*   LIPASE 1,746*  --   --  51,286* 130     Imagin/5 MRCP:  IMPRESSION:  1. Changes consistent with known acute pancreatitis. No evidence of  obstructing common bile duct stone. No significant biliary or  pancreatic ductal dilatation.  2. No evidence of peripancreatic fluid collection or pancreatic  necrosis. Mild ascites again noted minimally changed since  prior CT.  3. Fatty infiltration of the liver. Prior cholecystectomy.     Assessment/Plan:  Junito Coelho is a 40 year old with medical history of hypertension, cholecystectomy, obesity, and alcohol abuse who presents to the hospital with worsening abdominal pain found to have elevated LFTs and pancreatitis.    1. Acute pancreatitis. Transietnt choledocholithiasis suspected with significantly elevated transaminases and subsequent quick improvements. MRCP negative for choledocholithiasis. Has history of alcohol use and therefore alcohol related pancreatitis possible. TG normal. No inciting meds. Viral hepatitis serologies negative. Hereditary pancreatitis also considered with family history.    No evidence of pseudocyst or necrosis. Having intermittent low grade temps likely related to pancreatitis. WBC trending down. Lipase 15491 on admit and now down to 1746. Total bili 3.4-->2. ALT 1200-->729. -->117.    --Trend LFTs.   --Sips of clears today.   --If pain remains controlled could try clear tray tomorrow.   --IVFs, pain meds/antiemetic prn.      Ashley Fernandez, PAC  [facility] ([facility])

## 2021-04-06 NOTE — PROGRESS NOTES
[facility]    Medicine Progress Note - Hospitalist Service       Date of Admission:  4/5/2021  Date of Service: 04/06/2021    Assessment & Plan     This is a 40-year-old male with a past medical history significant for hypertension, morbid obesity, admitted to the hospital with concerns for abdominal pain.    Patient was seen in the ER about 4 days ago when he had onset of pain.  At that point his work-up was notable for mild transaminitis, lipase of 130.  He was able to discharge home and did well for the next couple of days.  However his pain became acutely severe which prompted him to come back to the ER.  This time his labs showed most notably a lipase of almost 50,000.  CT scan of the abdomen was consistent with acute pancreatitis.  He also had significant elevation ALT and AST.  With the ALT above thousand.    #Acute pancreatitis.  Suspected to be gallstone related.  Notable that patient has a previous history of cholecystectomy a few years ago.  He does binge drink on the weekends, and reported having 12 beers on the weekend.  While alcohol use could have exacerbated his pancreatitis, his transaminitis is not typical for alcohol use.  There was concern for a retained stone despite previous history of cholecystectomy, hence an MRCP was obtained which did not show any evidence of stone.  Certainly possible that he might have passed it already.  Otherwise no evidence of pancreatic necrosis or fluid collection. TGL are ok.  -Continue conservative treatment with IV fluids, n.p.o. status, starting some sips of ice chips and water.  -Required frequent IV morphine and was transitioned to a Dilaudid PCA last evening.  Pain is much better controlled with that  -Anticipate he could transition to clear liquids and off the PCA tomorrow  -Patient's lipase improved from 50,000 to about 1700 today.  Again this dramatic decrease is more consistent with a passed stone rather than primary alcoholic  pancreatitis.  -I do note that the patient was started on lisinopril a few months ago.  While this is very low on the differential, lisinopril induced pancreatitis is still a possibility.  Patient prefers to a different antihypertensive medication.  Certainly this could be simply idiopathic pancreatitis as well given family history of pancreatitis    #SIRS due to acute pancreatitis.  Patient with low-grade fevers, tachycardia.  Currently without any evidence of acute infection.  No evidence of pancreatic necrosis.  No other focus of infection.  MRCP did not show any signs of cholangitis.  Continue to monitor fever curve and for any other secondary signs of infection.  Hold off on empiric antibiotics    #Abnormal LFTs.  With significant transaminitis up to thousand range.  As discussed above his ALT/AST ratio is not typical for alcoholic induced hepatitis.  Suspect this is due to the primary etiology that caused pancreatitis.  -Monitoring LFTs, they are trending down  -Appreciate gastroenterology consultation    #Heavy alcohol use. No signs of withdrawal.  Discussed alcohol cessation.  Even if alcohol is not the primary culprit for his pancreatitis, could certainly exacerbate his pancreatitis and abnormal LFTs.    #HTN. Use PRN labetalol.     Diet: NPO for Medical/Clinical Reasons Except for: Meds, Ice Chips    DVT Prophylaxis: Pneumatic Compression Devices and Ambulate every shift  Moran Catheter: not present  Code Status: Full Code      Disposition Plan   Expected discharge: 2 - 3 days, recommended to prior living arrangement once adequate pain management/ tolerating PO medications.  Entered: Citlali Zuniga MD 04/06/2021, 12:50 PM       The patient's care was discussed with the Bedside Nurse and Patient.    Citlali Zuniga MD  Hospitalist Service  [facility]    ______________________________________________________________________    Interval History   Chart reviewed and patient seen.  Case discussed with nursing staff.     Patient feels well, Denies any chest pain, shortness of breath.  He reports that his abdominal pain is significantly better from yesterday.  Doing a lot better with the PCA.  Does have occasional nausea.  No vomiting.  I did speak with his wife last evening and explained plan of care per request.      Data reviewed today: I reviewed all medications, new labs and imaging results over the last 24 hours. I personally reviewed    Physical Exam   Vital Signs: Temp: 101.4  F (38.6  C) Temp src: Temporal BP: (!) 170/107 Pulse: 107   Resp: 16 SpO2: 92 % O2 Device: None (Room air)    Weight: 321 lbs 9.6 oz    GENERAL:  Awake and alert, No acute distress.  PSYCH: appropriate affect, no acute agitation   HEENT:  Neck is Supple, trachea is midline, EOMI, conjunctiva clear  CARDIOVASCULAR: Regular rate and rhythm, Normal S1, S2, no loud murmurs, no rubs or gallops.   PULMONARY:  Clear to auscultation bilaterally. Good air entry on both sides  GI: Abdomen is soft, mild TTP in epigastric area, mild-distended, bowel sounds present. No rebound or guarding   SKIN:  No cyanosis or clubbing, no obvious exanthems on exposed areas   MSK: Extremities are warm and well perfused. No pitting edema   Neuro: Awake and oriented x 3. Moving all extremities with good strength     Data   Recent Labs   Lab 04/06/21  0632 04/05/21  1147 04/05/21  0956 04/05/21  0156 04/01/21  0829   WBC 12.6*  --   --  15.3* 7.5   HGB 16.0  --   --  18.2* 16.2   MCV 90  --   --  88 86     --   --  369 266   INR  --   --  0.91  --   --     136  --  137 136   POTASSIUM 3.6 4.0  --  3.5 3.7   CHLORIDE 107 106  --  105 104   CO2 27 26  --  24 26   BUN 12 13  --  15 19   CR 0.99 1.11  --  1.29* 1.18   ANIONGAP 4 4  --  8 6   RADHA 8.1* 8.5  --  9.3 8.4*   * 145*  --  145* 104*   ALBUMIN 3.0* 3.5 3.5 3.8 3.7   PROTTOTAL 6.8 7.4 7.5 7.9 7.6   BILITOTAL 2.0* 2.7* 3.5* 3.4* 1.0   ALKPHOS 116 151* 156* 163* 61   ALT  729* 1,191* 1,262* 1,258* 142*   * 443* 531* 599* 138*   LIPASE 1,746*  --   --  51,286* 130   TROPI  --   --   --   --  <0.015       No results found for this or any previous visit (from the past 24 hour(s)).  Medications     HYDROmorphone       sodium chloride 150 mL/hr at 04/06/21 0631       sodium chloride (PF)  3 mL Intracatheter Q8H

## 2021-04-06 NOTE — PLAN OF CARE
NPO.  GI following.  MRCP today.  Low grade tempts max 100.4.  High BPs and tachy HRs, PRN labetalol - monitor.  PCA dilaudid started for better pain control.  Denies nausea except very mild after PRN IV morphine earlier, resolved with rest, declined any need for PRN antiemetic.  Denies N/T.  Up with SBA, declined gait belt, ambulating to bathroom.  Voiding.

## 2021-04-06 NOTE — PLAN OF CARE
"Tempt max 101.4, PRN tylenol x1, monitor.  Pain better today, managed with minimal use PCA dilaudid.  No nausea, tolerating water/ice chips but still NPO for others.  LS clear bilaterally, RA.  Up independently, ambulating in room.  Voiding, LBM today pt reported \"it was loose\" - monitor.  GI following.    "

## 2021-04-07 LAB
ALBUMIN SERPL-MCNC: 2.7 G/DL (ref 3.4–5)
ALP SERPL-CCNC: 91 U/L (ref 40–150)
ALT SERPL W P-5'-P-CCNC: 382 U/L (ref 0–70)
ANION GAP SERPL CALCULATED.3IONS-SCNC: 7 MMOL/L (ref 3–14)
AST SERPL W P-5'-P-CCNC: 39 U/L (ref 0–45)
BILIRUB SERPL-MCNC: 1.4 MG/DL (ref 0.2–1.3)
BUN SERPL-MCNC: 13 MG/DL (ref 7–30)
CALCIUM SERPL-MCNC: 8.5 MG/DL (ref 8.5–10.1)
CHLORIDE SERPL-SCNC: 106 MMOL/L (ref 94–109)
CO2 SERPL-SCNC: 23 MMOL/L (ref 20–32)
CREAT SERPL-MCNC: 0.97 MG/DL (ref 0.66–1.25)
ERYTHROCYTE [DISTWIDTH] IN BLOOD BY AUTOMATED COUNT: 12.9 % (ref 10–15)
GFR SERPL CREATININE-BSD FRML MDRD: >90 ML/MIN/{1.73_M2}
GLUCOSE SERPL-MCNC: 95 MG/DL (ref 70–99)
HCT VFR BLD AUTO: 44.5 % (ref 40–53)
HGB BLD-MCNC: 14.9 G/DL (ref 13.3–17.7)
LACTATE BLD-SCNC: 1 MMOL/L (ref 0.7–2)
MCH RBC QN AUTO: 30.5 PG (ref 26.5–33)
MCHC RBC AUTO-ENTMCNC: 33.5 G/DL (ref 31.5–36.5)
MCV RBC AUTO: 91 FL (ref 78–100)
PLATELET # BLD AUTO: 206 10E9/L (ref 150–450)
POTASSIUM SERPL-SCNC: 3.4 MMOL/L (ref 3.4–5.3)
PROT SERPL-MCNC: 6.5 G/DL (ref 6.8–8.8)
RBC # BLD AUTO: 4.88 10E12/L (ref 4.4–5.9)
SODIUM SERPL-SCNC: 136 MMOL/L (ref 133–144)
WBC # BLD AUTO: 13.9 10E9/L (ref 4–11)

## 2021-04-07 PROCEDURE — 99232 SBSQ HOSP IP/OBS MODERATE 35: CPT | Performed by: INTERNAL MEDICINE

## 2021-04-07 PROCEDURE — 258N000003 HC RX IP 258 OP 636: Performed by: INTERNAL MEDICINE

## 2021-04-07 PROCEDURE — 120N000001 HC R&B MED SURG/OB

## 2021-04-07 PROCEDURE — 250N000013 HC RX MED GY IP 250 OP 250 PS 637: Performed by: INTERNAL MEDICINE

## 2021-04-07 PROCEDURE — 36415 COLL VENOUS BLD VENIPUNCTURE: CPT | Performed by: INTERNAL MEDICINE

## 2021-04-07 PROCEDURE — 80053 COMPREHEN METABOLIC PANEL: CPT | Performed by: INTERNAL MEDICINE

## 2021-04-07 PROCEDURE — 85027 COMPLETE CBC AUTOMATED: CPT | Performed by: INTERNAL MEDICINE

## 2021-04-07 PROCEDURE — 83605 ASSAY OF LACTIC ACID: CPT | Performed by: INTERNAL MEDICINE

## 2021-04-07 RX ORDER — AMLODIPINE BESYLATE 10 MG/1
10 TABLET ORAL DAILY
Status: DISCONTINUED | OUTPATIENT
Start: 2021-04-07 | End: 2021-04-08 | Stop reason: HOSPADM

## 2021-04-07 RX ORDER — HYDROMORPHONE HYDROCHLORIDE 1 MG/ML
0.5 INJECTION, SOLUTION INTRAMUSCULAR; INTRAVENOUS; SUBCUTANEOUS
Status: DISCONTINUED | OUTPATIENT
Start: 2021-04-07 | End: 2021-04-08 | Stop reason: HOSPADM

## 2021-04-07 RX ORDER — OXYCODONE HYDROCHLORIDE 5 MG/1
5-10 TABLET ORAL EVERY 4 HOURS PRN
Status: DISCONTINUED | OUTPATIENT
Start: 2021-04-07 | End: 2021-04-08 | Stop reason: HOSPADM

## 2021-04-07 RX ADMIN — SODIUM CHLORIDE, PRESERVATIVE FREE: 5 INJECTION INTRAVENOUS at 02:14

## 2021-04-07 RX ADMIN — SODIUM CHLORIDE, PRESERVATIVE FREE: 5 INJECTION INTRAVENOUS at 18:13

## 2021-04-07 RX ADMIN — ACETAMINOPHEN 650 MG: 325 TABLET, FILM COATED ORAL at 08:43

## 2021-04-07 RX ADMIN — SODIUM CHLORIDE, PRESERVATIVE FREE: 5 INJECTION INTRAVENOUS at 09:16

## 2021-04-07 RX ADMIN — AMLODIPINE BESYLATE 10 MG: 10 TABLET ORAL at 10:22

## 2021-04-07 RX ADMIN — OXYCODONE HYDROCHLORIDE 5 MG: 5 TABLET ORAL at 20:38

## 2021-04-07 RX ADMIN — OXYCODONE HYDROCHLORIDE 5 MG: 5 TABLET ORAL at 13:51

## 2021-04-07 ASSESSMENT — ACTIVITIES OF DAILY LIVING (ADL)
ADLS_ACUITY_SCORE: 16
ADLS_ACUITY_SCORE: 14
ADLS_ACUITY_SCORE: 16
ADLS_ACUITY_SCORE: 16

## 2021-04-07 NOTE — PLAN OF CARE
Night RN/P Aksamit  BP (!) 167/105 (BP Location: Right arm)   Pulse 98   Temp 98.9  F (37.2  C) (Temporal)   Resp 16   Wt 145.9 kg (321 lb 9.6 oz)   SpO2 94%   BMI 44.85 kg/m    Afebrile, BP elevated but does not meet parameters for BP medication.   lungs are clear  BS hypoactive; passing flatus, denies nausea  Rating abdominal pain 3-4/10. Using PCA minimally.

## 2021-04-07 NOTE — PROGRESS NOTES
[facility]  Hospitalist Progress Note  Chicho Polanco MD 04/07/21    Reason for Stay (Diagnosis): acute pancreatitis, hepatitis         Assessment and Plan:      Summary of Stay: Junito Coelho is a 40 year old male with history of HTN and morbid obesity with previous cholecystectomy who was admitted on 4/5/2021 after presenting with severe upper abdominal pain.  Initially seen in the ER 4 days ago with abdominal pain that resolved after GI cocktail.  Pain then returned after some alcohol intake so he came back in for evaluation and was found to have significantly elevated lipase of 51,000 along with elevated ALT at 1258, , and bilirubin 3.4.  CT the abdomen pelvis showed a moderately inflamed pancreas and hepatic steatosis.  He was admitted for IV fluids and pain control.  GI was consulted.  He underwent MRCP showed acute pancreatitis with no evidence for choledocholithiasis or peripancreatic fluid collection.  Rapid improvement in lipase and transaminases suggestive that he may have passed a bile duct stone although given his alcohol use this is also in the differential.  Was having increased pain requiring Dilaudid PCA, now transitioning to oral/IV regimen due to improved condition.  Advancing diet as tolerated today.    Problem List/Assessment and Plan:   Acute pancreatitis, hepatitis: Presented with severe upper abdominal pain.  He does do some binge drinking on the weekends and reported having 12 beers over the weekend.  Initial lipase of 51,000 would be consistent with alcohol pancreatitis, however he is ALT was also 1258, , and bilirubin 3.4.  This along with rapid improvement in these levels over the next 24-48 hours is more suggestive that he may have passed a bile duct stone.  CT abdomen/pelvis and MRCP did not show any retained stone or pancreatic fluid collection.  He also takes lisinopril which is on the list for potential pancreatitis medications.  Triglycerides not  significantly elevated 172 to cause pancreatitis.  -GI consulted  -ADAT to low-fat diet today  -Repeat CMP tomorrow  -Stop Dilaudid PCA, start oral oxycodone and IV Dilaudid as needed  -Recommend no further alcohol use at least for the next few weeks, discussed with the patient    Hepatic steatosis, HLD: CT abdomen pelvis shows hepatic steatosis.  LDL elevated at 111 and triglycerides 172.  -Consider statin in the future, however not now with his elevated transaminases    Intermittent fevers: Intermittent fevers here with leukocytosis likely secondary to acute pancreatitis.  Doubt any acute infection at this time.  Hold on any empiric antibiotics.    HTN: PTA amlodipine 5 mg daily lisinopril 20 mg daily.  -Patient would prefer stopping lisinopril as this has some possibility of causing pancreatitis although doubt this is the case in his situation  -Increase amlodipine to 10 mg daily    Morbid obesity: MI 44.      DVT Prophylaxis: Pneumatic Compression Devices  Code Status: Full Code  FEN: ADAT to low-fat diet, NS at 100 ml/hr  Discharge Dispo: Home  Estimated Disch Date / # of Days until Disch: Tomorrow if tolerating diet with minimal pain        Interval History (Subjective):      Upper abdominal pain significantly improved.  Using minimal Dilaudid PCA.  Denies any nausea or vomiting.  Tolerating clear liquid diet this morning.                  Physical Exam:      Last Vital Signs:  BP (!) 163/100 (BP Location: Right arm)   Pulse 103   Temp 99.2  F (37.3  C) (Temporal)   Resp 18   Wt 145.9 kg (321 lb 9.6 oz)   SpO2 94%   BMI 44.85 kg/m        Intake/Output Summary (Last 24 hours) at 4/7/2021 1702  Last data filed at 4/7/2021 1024  Gross per 24 hour   Intake 8 ml   Output --   Net 8 ml       Constitutional: Awake, NAD   Eyes: sclera white   HEENT:   MMM  Respiratory: no respiratory distress, lungs cta bilaterally, no crackles or wheeze  Cardiovascular: RRR.  No murmur   GI: Obese, nontender to palpation all  quadrants, bowel sounds present  Skin: no rash   Musculoskeletal/extremities:  No edema  Neurologic: A&O   Psychiatric: calm, cooperative, normal affect         Medications:      All current medications were reviewed with changes reflected in problem list.         Data:      All new lab and imaging data was reviewed.   Labs:  Recent Labs   Lab 04/07/21  0643 04/06/21  0632 04/05/21  0156   WBC 13.9* 12.6* 15.3*   HGB 14.9 16.0 18.2*   HCT 44.5 46.9 52.8   MCV 91 90 88    233 369     Recent Labs   Lab 04/07/21  0643 04/06/21  0632 04/05/21  1147    138 136   POTASSIUM 3.4 3.6 4.0   CHLORIDE 106 107 106   CO2 23 27 26   ANIONGAP 7 4 4   GLC 95 116* 145*   BUN 13 12 13   CR 0.97 0.99 1.11   GFRESTIMATED >90 >90 83   GFRESTBLACK >90 >90 >90   RADHA 8.5 8.1* 8.5   PROTTOTAL 6.5* 6.8 7.4   ALBUMIN 2.7* 3.0* 3.5   BILITOTAL 1.4* 2.0* 2.7*   ALKPHOS 91 116 151*   AST 39 117* 443*   * 729* 1,191*      Imaging:   None today      Chicho Polanco MD

## 2021-04-07 NOTE — PLAN OF CARE
Patient Aox4. Pain 4-5/10. Used 3 doses PCA for the shift. Has has mild temp, Tmax for the shift= 99.8. Patient tolerating NPO with water orders. Denies n/v, n/t. BS hypoactive. Labs remain elevated, but improving. Up independently. BM x1 this shift, loose per patient. Will continue to monitor and follow plan of care.    Yecenia Estevez RN on 4/6/2021 at 10:26 PM

## 2021-04-07 NOTE — PROGRESS NOTES
Full note to follow  Pain much better as are lfts.  Advance to clear liquid for breakfast, further for lunch if no more pain.  Decreased NS to 100 ml/hr.  Change from dilaudid pca to oral oxy/prn IV dilaudid.  If better home tomorrow.

## 2021-04-07 NOTE — PLAN OF CARE
A&Ox4. VSS. Ind in room. Minimal pain to abd. Took one dose PRN oxy which was effective. Tolerated clears well. Denies nausea. IVF. Shower today. Discharge home when able.

## 2021-04-07 NOTE — PROGRESS NOTES
GASTROENTEROLOGY PROGRESS NOTE     SUBJECTIVE:  Pain mostly resolved. Denies nausea, vomiting. +flatus and BM. Switched from sips of clears to clear liquid diet today and tolerating thus far. Off Dilaudid PCA and pain being controlled with oral pain meds.     OBJECTIVE:    BP (!) 165/109 (BP Location: Right arm)   Pulse 107   Temp 95.9  F (35.5  C) (Temporal)   Resp 16   Wt 145.9 kg (321 lb 9.6 oz)   SpO2 95%   BMI 44.85 kg/m    Temp (24hrs), Av.5  F (37.5  C), Min:98.5  F (36.9  C), Max:101.1  F (38.4  C)    Patient Vitals for the past 72 hrs:   Weight   21 0639 145.9 kg (321 lb 9.6 oz)       Intake/Output Summary (Last 24 hours) at 2021 1158  Last data filed at 2021 1035  Gross per 24 hour   Intake 3951 ml   Output --   Net 3951 ml        PHYSICAL EXAM  Gen: alert, oriented, NAD  Abd: mild distension, nontender +BS     Additional Comments:  ROS, FH, SH: See initial GI consult for details.     I have reviewed the patient's new clinical lab results:     Recent Labs   Lab Test 21  0956 21  0156   WBC 13.9* 12.6*  --  15.3*   HGB 14.9 16.0  --  18.2*   MCV 91 90  --  88    233  --  369   INR  --   --  0.91  --      Recent Labs   Lab Test 21  0632 21  1147   POTASSIUM 3.4 3.6 4.0   CHLORIDE 106 107 106   CO2 23 27 26   BUN 13 12 13   ANIONGAP 7 4 4     Recent Labs   Lab Test 2143 21  0632 21  1147 21  0156 21  0156 21  0829   ALBUMIN 2.7* 3.0* 3.5   < > 3.8 3.7   BILITOTAL 1.4* 2.0* 2.7*   < > 3.4* 1.0   * 729* 1,191*   < > 1,258* 142*   AST 39 117* 443*   < > 599* 138*   LIPASE  --  1,746*  --   --  51,286* 130    < > = values in this interval not displayed.     Imagin/5 MRCP:  IMPRESSION:  1. Changes consistent with known acute pancreatitis. No evidence of  obstructing common bile duct stone. No significant biliary or  pancreatic ductal dilatation.  2. No evidence of  peripancreatic fluid collection or pancreatic  necrosis. Mild ascites again noted minimally changed since prior CT.  3. Fatty infiltration of the liver. Prior cholecystectomy.     Assessment/Plan:  Junito Coelho is a 40 year old with medical history of hypertension, cholecystectomy, obesity, and alcohol abuse who presents to the hospital with worsening abdominal pain found to have elevated LFTs and pancreatitis.    1. Acute pancreatitis. Transientt choledocholithiasis suspected with significantly elevated transaminases and subsequent quick improvements. MRCP negative for choledocholithiasis. Has history of alcohol use and therefore alcohol related pancreatitis possible. TG normal. No inciting meds. Viral hepatitis serologies negative. Hereditary pancreatitis also considered with family history.    No evidence of pseudocyst or necrosis. Having intermittent low grade temps likely related to pancreatitis. WBC trending down. Lipase 46696 on admit, down to 1746. Total bili 2-->1.4. -->382. -->39.    --If tolerates clears, ok to advance diet as tolerated to low fat.  --Likely discharge tomorrow.   --Will review if EUS indicated after discharge with attending. If so, will contact patient after discharge to arrange in 6-8 weeks.     Ashley Fernandez, PAC  [facility] ([facility])

## 2021-04-08 VITALS
SYSTOLIC BLOOD PRESSURE: 170 MMHG | HEART RATE: 102 BPM | DIASTOLIC BLOOD PRESSURE: 100 MMHG | BODY MASS INDEX: 44.85 KG/M2 | OXYGEN SATURATION: 95 % | TEMPERATURE: 95.6 F | WEIGHT: 315 LBS | RESPIRATION RATE: 18 BRPM

## 2021-04-08 LAB
ALBUMIN SERPL-MCNC: 2.6 G/DL (ref 3.4–5)
ALP SERPL-CCNC: 87 U/L (ref 40–150)
ALT SERPL W P-5'-P-CCNC: 242 U/L (ref 0–70)
ANION GAP SERPL CALCULATED.3IONS-SCNC: 6 MMOL/L (ref 3–14)
AST SERPL W P-5'-P-CCNC: 25 U/L (ref 0–45)
BILIRUB SERPL-MCNC: 1.2 MG/DL (ref 0.2–1.3)
BUN SERPL-MCNC: 13 MG/DL (ref 7–30)
CALCIUM SERPL-MCNC: 8.4 MG/DL (ref 8.5–10.1)
CHLORIDE SERPL-SCNC: 106 MMOL/L (ref 94–109)
CO2 SERPL-SCNC: 24 MMOL/L (ref 20–32)
CREAT SERPL-MCNC: 0.91 MG/DL (ref 0.66–1.25)
ERYTHROCYTE [DISTWIDTH] IN BLOOD BY AUTOMATED COUNT: 12.5 % (ref 10–15)
GFR SERPL CREATININE-BSD FRML MDRD: >90 ML/MIN/{1.73_M2}
GLUCOSE SERPL-MCNC: 99 MG/DL (ref 70–99)
HCT VFR BLD AUTO: 45.4 % (ref 40–53)
HGB BLD-MCNC: 15.3 G/DL (ref 13.3–17.7)
LACTATE BLD-SCNC: 0.7 MMOL/L (ref 0.7–2)
MCH RBC QN AUTO: 30.5 PG (ref 26.5–33)
MCHC RBC AUTO-ENTMCNC: 33.7 G/DL (ref 31.5–36.5)
MCV RBC AUTO: 90 FL (ref 78–100)
PLATELET # BLD AUTO: 258 10E9/L (ref 150–450)
POTASSIUM SERPL-SCNC: 3.4 MMOL/L (ref 3.4–5.3)
PROT SERPL-MCNC: 7.1 G/DL (ref 6.8–8.8)
RBC # BLD AUTO: 5.02 10E12/L (ref 4.4–5.9)
SODIUM SERPL-SCNC: 136 MMOL/L (ref 133–144)
WBC # BLD AUTO: 13.8 10E9/L (ref 4–11)

## 2021-04-08 PROCEDURE — 36415 COLL VENOUS BLD VENIPUNCTURE: CPT | Performed by: INTERNAL MEDICINE

## 2021-04-08 PROCEDURE — 85027 COMPLETE CBC AUTOMATED: CPT | Performed by: INTERNAL MEDICINE

## 2021-04-08 PROCEDURE — 83605 ASSAY OF LACTIC ACID: CPT | Performed by: INTERNAL MEDICINE

## 2021-04-08 PROCEDURE — 250N000013 HC RX MED GY IP 250 OP 250 PS 637: Performed by: INTERNAL MEDICINE

## 2021-04-08 PROCEDURE — 258N000003 HC RX IP 258 OP 636: Performed by: INTERNAL MEDICINE

## 2021-04-08 PROCEDURE — 99239 HOSP IP/OBS DSCHRG MGMT >30: CPT | Performed by: INTERNAL MEDICINE

## 2021-04-08 PROCEDURE — 80053 COMPREHEN METABOLIC PANEL: CPT | Performed by: INTERNAL MEDICINE

## 2021-04-08 RX ORDER — OXYCODONE HYDROCHLORIDE 5 MG/1
5-10 TABLET ORAL EVERY 6 HOURS PRN
Qty: 12 TABLET | Refills: 0 | Status: SHIPPED | OUTPATIENT
Start: 2021-04-08 | End: 2021-04-11

## 2021-04-08 RX ORDER — HYDRALAZINE HYDROCHLORIDE 20 MG/ML
10 INJECTION INTRAMUSCULAR; INTRAVENOUS EVERY 4 HOURS PRN
Status: DISCONTINUED | OUTPATIENT
Start: 2021-04-08 | End: 2021-04-08 | Stop reason: HOSPADM

## 2021-04-08 RX ADMIN — AMLODIPINE BESYLATE 10 MG: 10 TABLET ORAL at 08:00

## 2021-04-08 RX ADMIN — SODIUM CHLORIDE, PRESERVATIVE FREE: 5 INJECTION INTRAVENOUS at 03:52

## 2021-04-08 ASSESSMENT — ACTIVITIES OF DAILY LIVING (ADL)
ADLS_ACUITY_SCORE: 14

## 2021-04-08 NOTE — PROVIDER NOTIFICATION
Page out to hospitalist    Patient Bp elevated throughout of the day, currently 170/104, asymptomatic did not meet the parameter for labetalol,  please advice.

## 2021-04-08 NOTE — DISCHARGE SUMMARY
[facility]  Discharge Summary  Name: Junito Coelho    MRN: [id number]  YOB: 1980    Age: 40 year old  Date of Discharge:  4/8/2021 10:10 AM  Date of Admission: 4/5/2021  Primary Care Provider: Rosa Maldonado  Discharge Physician:  Chicho Polanco MD  Discharging Service:  Hospitalist      Discharge Diagnoses:  Acute pancreatitis  Acute hepatitis  Hepatic steatosis  HLD  Intermittent fevers  HTN  Morbid obesity     Hospital Course:  Summary of Stay:   Junito Coelho is a 40 year old male with history of HTN and morbid obesity with previous cholecystectomy who was admitted on 4/5/2021 after presenting with severe upper abdominal pain.  Initially seen in the ER 4 days ago with abdominal pain that resolved after GI cocktail.  Pain then returned after some alcohol intake so he came back in for evaluation and was found to have significantly elevated lipase of 51,000 along with elevated ALT at 1258, , and bilirubin 3.4.  CT the abdomen pelvis showed a moderately inflamed pancreas and hepatic steatosis.  He was admitted for IV fluids and pain control.  GI was consulted.  He underwent MRCP showed acute pancreatitis with no evidence for choledocholithiasis or peripancreatic fluid collection.  Rapid improvement in lipase and transaminases is suggestive that he may have passed a bile duct stone although given his alcohol use this is also in the differential.  Was having increased pain requiring Dilaudid PCA, now transitionedto oral/IV regimen due to improved condition.    Tolerating low-fat diet and very minimal pain today is appropriate for discharge.  Did provide 12 tablets oxycodone 5 mg if needed for pain over the next day or 2.  Follow-up with primary care doctor in 1 week with a CMP.     Problem List/Assessment and Plan:   Acute pancreatitis, acute hepatitis: Presented with severe upper abdominal pain.  He does do some binge drinking on the weekends and reported having  12 beers over the weekend.  Initial lipase of 51,000 would be consistent with alcohol pancreatitis, however he is ALT was also 1258, , and bilirubin 3.4.  This along with rapid improvement in these levels over the next 24-48 hours is more suggestive that he may have passed a bile duct stone.  CT abdomen/pelvis and MRCP did not show any retained stone or pancreatic fluid collection.  He also takes lisinopril which is on the list for potential pancreatitis medications.  Triglycerides not significantly elevated 172 to cause pancreatitis.  -GI consulted  -Tolerating low-fat diet  -Rapid improvement in LFTs, ALT still mildly elevated.  Repeat CMP in clinic in 7 days  -Small supply oxycodone 12 tablet 5 mg for knee pain over the next day or 2  -Recommend no further alcohol use at least for the next few weeks, discussed with the patient.  Also, informed him that it is possible he could have recurrence of pancreatitis with any alcohol in the future.     Hepatic steatosis, HLD: CT abdomen pelvis shows hepatic steatosis.  LDL elevated at 111 and triglycerides 172.  -Consider statin in the future, however not now with his elevated transaminases     Intermittent fevers: Intermittent fevers here with leukocytosis likely secondary to acute pancreatitis.  Doubt any acute infection at this time.  Hold on any empiric antibiotics.     HTN: PTA amlodipine 5 mg daily lisinopril 20 mg daily.  Initially considered stopping lisinopril due to potential association with acute pancreatitis, however given his significant LFTs and lipase with rapid improvement it is much more suggestive of passing a stone.  Additionally, alcohol use much more common as well.  Fairly hypertensive here.  He will resume his home meds and follow up with PCP in one week.     Morbid obesity: BMI 44.     Discharge Disposition:  Discharged to home     Allergies:  No Known Allergies     Discharge Medications:   Current Discharge Medication List      START taking  these medications    Details   oxyCODONE (ROXICODONE) 5 MG tablet Take 1-2 tablets (5-10 mg) by mouth every 6 hours as needed for pain  Qty: 12 tablet, Refills: 0    Associated Diagnoses: Acute pancreatitis without infection or necrosis, unspecified pancreatitis type         CONTINUE these medications which have NOT CHANGED    Details   amLODIPine (NORVASC) 5 MG tablet Take 5 mg by mouth daily      lisinopril (ZESTRIL) 20 MG tablet TAKE 1 TABLET BY MOUTH TWICE A DAY  Qty: 60 tablet, Refills: 1    Comments: Patient due for labs and appointment in January  Associated Diagnoses: HTN, goal below 140/90              Condition on Discharge:  Discharge condition: Good   Discharge vitals: Blood pressure (!) (P) 162/102, pulse (P) 91, temperature (P) 97  F (36.1  C), resp. rate (P) 18, weight 145.9 kg (321 lb 9.6 oz), SpO2 (P) 96 %.   Code status on discharge: Full Code     History of Illness:  See detailed admission note for full details.    Physical Exam:  Blood pressure (!) (P) 162/102, pulse (P) 91, temperature (P) 97  F (36.1  C), resp. rate (P) 18, weight 145.9 kg (321 lb 9.6 oz), SpO2 (P) 96 %.  Wt Readings from Last 1 Encounters:   04/05/21 145.9 kg (321 lb 9.6 oz)     Constitutional: Awake, NAD   Eyes: sclera white   HEENT:   MMM  Respiratory: no respiratory distress, lungs cta bilaterally, no crackles or wheeze  Cardiovascular: RRR.  No murmur   GI: Obese, non-tender, not distended, bowel sounds present  Skin: no rash   Musculoskeletal/extremities:  No edema  Neurologic: A&O, speech clear   Psychiatric: calm, cooperative, normal affect    Procedures other than Imaging:  None     Imaging:  Results for orders placed or performed during the hospital encounter of 04/05/21   CT Abdomen Pelvis w Contrast    Narrative    EXAM: CT ABDOMEN PELVIS W CONTRAST  LOCATION: [facility]  DATE/TIME: 4/5/2021 3:13 AM    INDICATION: Abdominal distension  Epigastric pain  COMPARISON: Limited abdominal ultrasound  06/14/2015.  TECHNIQUE: CT scan of the abdomen and pelvis was performed following injection of IV contrast. Multiplanar reformats were obtained. Dose reduction techniques were used.  CONTRAST: 97mL Isovue-370    FINDINGS:   LOWER CHEST: Normal.    HEPATOBILIARY: Enlarged fatty liver. Postcholecystectomy. No biliary dilatation.    PANCREAS: Moderate inflammation of the pancreas. Small amount of fluid tracks inferiorly from the pancreas in the retroperitoneum. There appears to be homogeneous enhancement of the pancreatic parenchyma. Normal caliber pancreatic duct.    SPLEEN: Normal.    ADRENAL GLANDS: Normal.    KIDNEYS/BLADDER: Normal.    BOWEL: Normal. No obstruction or inflammation. Normal appendix.    LYMPH NODES: Normal.    VASCULATURE: Unremarkable.    PELVIC ORGANS: Normal.    MUSCULOSKELETAL: Small to moderate-sized fat-containing periumbilical hernia. Small amount of fat in the right inguinal canal.      Impression    IMPRESSION:   1.  Acute pancreatitis.  2.  Hepatic steatosis.   MR Abdomen MRCP w/o & w Contrast    Narrative    MR ABDOMEN MRCP WITHOUT AND WITH CONTRAST  4/5/2021 9:41 AM     HISTORY: Pancreatitis, acute, severe.    COMPARISON: CT abdomen and pelvis 4/5/2021.    TECHNIQUE:  Multisequence, multiplanar imaging of the abdomen is  performed without contrast. A total of 14 mL Gadavist is then given  intravenously. Additional dynamic axial T1 fat-sat sequences are  performed.    FINDINGS: There is fatty infiltration of the liver. Postcontrast  images are mildly degraded by motion artifact, but no abnormal  enhancing liver lesions are appreciated. Prior cholecystectomy. The  spleen, adrenal glands and kidneys are unremarkable. No  hydronephrosis. No enlarged abdominal lymph nodes. Small splenule  anterior to the spleen noted on series 24, image 41. No abnormal  restricted diffusion.    Peripancreatic inflammatory changes are noted predominantly about the  pancreatic tail with fluid in the left  paracolic gutter and anterior  left pararenal space. No peripancreatic fluid collections are  identified. No evidence of pancreatic necrosis at this time. No  evidence of abnormal restricted diffusion.    Three-dimensional MRCP was performed using maximum intensity  projection reconstruction on the same workstation showing no dilated  intrahepatic or extrahepatic bile ducts. No pancreatic duct dilatation  or abnormal pancreatic ductal anomaly.      Impression    IMPRESSION:  1. Changes consistent with known acute pancreatitis. No evidence of  obstructing common bile duct stone. No significant biliary or  pancreatic ductal dilatation.  2. No evidence of peripancreatic fluid collection or pancreatic  necrosis. Mild ascites again noted minimally changed since prior CT.  3. Fatty infiltration of the liver. Prior cholecystectomy.    DIONNA PENN MD        Consultations:  Consultation during this admission received from gastroenterology.       Recent Lab Results:  Recent Labs   Lab 04/08/21  0650 04/07/21  0643 04/06/21  0632   WBC 13.8* 13.9* 12.6*   HGB 15.3 14.9 16.0   HCT 45.4 44.5 46.9   MCV 90 91 90    206 233     Recent Labs   Lab 04/05/21  0311   CULT No growth after 3 days  No growth after 3 days     Recent Labs   Lab 04/08/21  0650 04/07/21  0643 04/06/21  0632    136 138   POTASSIUM 3.4 3.4 3.6   CHLORIDE 106 106 107   CO2 24 23 27   ANIONGAP 6 7 4   GLC 99 95 116*   BUN 13 13 12   CR 0.91 0.97 0.99   GFRESTIMATED >90 >90 >90   GFRESTBLACK >90 >90 >90   RADHA 8.4* 8.5 8.1*   PROTTOTAL 7.1 6.5* 6.8   ALBUMIN 2.6* 2.7* 3.0*   BILITOTAL 1.2 1.4* 2.0*   ALKPHOS 87 91 116   AST 25 39 117*   * 382* 729*     Recent Labs   Lab 04/06/21  0632 04/05/21  0156   LIPASE 1,746* 51,286*          Pending Results:    Unresulted Labs Ordered in the Past 30 Days of this Admission     Date and Time Order Name Status Description    4/5/2021 0252 Blood culture Preliminary     4/5/2021 0231 Blood culture Preliminary           These results will be followed up by patient's primary care provider.    Discharge Instructions and Follow-Up:   Discharge Procedure Orders   Reason for your hospital stay   Order Comments: You were hospitalized for pancreatitis and elevation in your liver test.  Based on your clinical course we think it is most likely that she had a small stone form in your bile duct that subsequently passed based on your rapid improvement in labs and symptoms over a short period of time.  Alcohol certainly could be contributing and recommend avoiding all use at least for the next few weeks.  It is possible and that any further alcohol use could result in recurrent pancreatitis.  Your blood pressures have been elevated here due to holding your lisinopril.  Although there have been some case reports of lisinopril causing pancreatitis, based on further evaluation I think it is much more likely that you have passed a stone or it is from alcohol as typically medication induced pancreatitis does not cause significant elevation in liver tests which you had therefore I recommend you restart lisinopril discharge.     Follow-up and recommended labs and tests    Order Comments: Follow up with primary care provider, Rosa Maldonado, within 7 days for hospital follow- up of pancreatitis, BP check, labs.  The following labs/tests are recommended: cmp.     Activity   Order Comments: Your activity upon discharge: activity as tolerated     Order Specific Question Answer Comments   Is discharge order? Yes      Full Code     Order Specific Question Answer Comments   Code status determined by: Discussion with patient/ legal decision maker      Diet   Order Comments: Follow this diet upon discharge: Orders Placed This Encounter      Low Fat Diet     Order Specific Question Answer Comments   Is discharge order? Yes          I, Chicho Polanco MD, personally saw the patient today and spent greater than 30 minutes discharging this  patient.    Chicho Polanco MD

## 2021-04-08 NOTE — PLAN OF CARE
A&Ox4. VSS. Ind in room. Minimal pain to abd. Declined prn meds today. Tolerated clears well. Denies nausea.Discharge home today. AVS done. Oxy script sent with patient.

## 2021-04-08 NOTE — PLAN OF CARE
A/Ox4, VSS: Bp elevated, asymptomatic. Up independently in the room. Pain managed with PRN Oxycodone. IVF infusing. BMX2 overnight per pt. Voiding good amounts.

## 2021-04-09 ENCOUNTER — PATIENT OUTREACH (OUTPATIENT)
Dept: CARE COORDINATION | Facility: CLINIC | Age: 41
End: 2021-04-09

## 2021-04-09 ENCOUNTER — TELEPHONE (OUTPATIENT)
Dept: INTERNAL MEDICINE | Facility: CLINIC | Age: 41
End: 2021-04-09

## 2021-04-09 DIAGNOSIS — Z71.89 OTHER SPECIFIED COUNSELING: ICD-10-CM

## 2021-04-09 NOTE — PROGRESS NOTES
Clinic Care Coordination Contact  [facility]/Voicemail       Clinical Data: Care Coordinator Outreach  Outreach attempted x 1.  Left message on patient's voicemail with call back information and requested return call.  Plan:Care Coordinator will try to reach patient again in 1-2 business days.

## 2021-04-09 NOTE — LETTER
[facility] CARE COORDINATION  [address] [facility] [address]    April 12, 2021    Junito Coelho  [address] [facility] [address]      Dear Junito,    I am a clinic community health worker who works with Rosa Maldonado MD at [facility]. I have been trying to reach you recently to introduce Clinic Care Coordination and to see if there was anything I could assist you with.  Below is a description of clinic care coordination and how I can further assist you.      The clinic care coordination team is made up of a registered nurse,  and community health worker who understand the health care system. The goal of clinic care coordination is to help you manage your health and improve access to the health care system in the most efficient manner. The team can assist you in meeting your health care goals by providing education, coordinating services, strengthening the communication among your providers and supporting you with any resource needs.    Please feel free to contact me at [phone] with any questions or concerns. We are focused on providing you with the highest-quality healthcare experience possible and that all starts with you.     Sincerely,     Chrissie

## 2021-04-09 NOTE — TELEPHONE ENCOUNTER
IP F/U    Date: 4/8/21  Diagnosis: Acute Pancreatitis Without Infection Or Necrosis, Unspecified Pancreatitis Type  Is patient active in care coordination? No  Was patient in TCU? No

## 2021-04-11 LAB
BACTERIA SPEC CULT: NO GROWTH
BACTERIA SPEC CULT: NO GROWTH
SPECIMEN SOURCE: NORMAL
SPECIMEN SOURCE: NORMAL

## 2021-04-12 NOTE — PROGRESS NOTES
Clinic Care Coordination Contact  [facility]/Voicemail       Clinical Data: Care Coordinator Outreach  Outreach attempted x 2.  Left message on patient's voicemail with call back information and requested return call.  Plan: Care Coordinator will send care coordination introduction letter with care coordinator contact information and explanation of care coordination services via [employer]. Care Coordinator will do no further outreaches at this time.

## 2021-09-18 ENCOUNTER — HEALTH MAINTENANCE LETTER (OUTPATIENT)
Age: 41
End: 2021-09-18

## 2021-11-04 DIAGNOSIS — I10 ESSENTIAL (PRIMARY) HYPERTENSION: ICD-10-CM

## 2021-11-04 RX ORDER — AMLODIPINE BESYLATE 5 MG/1
5 TABLET ORAL DAILY
Qty: 14 TABLET | Refills: 0 | Status: SHIPPED | OUTPATIENT
Start: 2021-11-04 | End: 2021-11-29

## 2021-11-13 ENCOUNTER — HEALTH MAINTENANCE LETTER (OUTPATIENT)
Age: 41
End: 2021-11-13

## 2021-11-27 DIAGNOSIS — I10 ESSENTIAL (PRIMARY) HYPERTENSION: ICD-10-CM

## 2021-11-28 DIAGNOSIS — I10 HTN, GOAL BELOW 140/90: ICD-10-CM

## 2021-11-29 RX ORDER — LISINOPRIL 20 MG/1
20 TABLET ORAL 2 TIMES DAILY
Qty: 14 TABLET | Refills: 0 | OUTPATIENT
Start: 2021-11-29

## 2021-11-29 RX ORDER — AMLODIPINE BESYLATE 5 MG/1
TABLET ORAL
Qty: 14 TABLET | Refills: 0 | Status: SHIPPED | OUTPATIENT
Start: 2021-11-29 | End: 2022-12-05

## 2022-11-19 ENCOUNTER — HEALTH MAINTENANCE LETTER (OUTPATIENT)
Age: 42
End: 2022-11-19

## 2022-11-25 ENCOUNTER — OFFICE VISIT (OUTPATIENT)
Dept: FAMILY MEDICINE | Facility: CLINIC | Age: 42
End: 2022-11-25
Payer: COMMERCIAL

## 2022-11-25 VITALS
HEART RATE: 85 BPM | RESPIRATION RATE: 18 BRPM | WEIGHT: 315 LBS | HEIGHT: 71 IN | TEMPERATURE: 97.6 F | SYSTOLIC BLOOD PRESSURE: 180 MMHG | OXYGEN SATURATION: 97 % | BODY MASS INDEX: 44.1 KG/M2 | DIASTOLIC BLOOD PRESSURE: 130 MMHG

## 2022-11-25 DIAGNOSIS — I10 HYPERTENSION GOAL BP (BLOOD PRESSURE) < 140/90: Primary | ICD-10-CM

## 2022-11-25 PROBLEM — K85.90 ACUTE PANCREATITIS WITHOUT INFECTION OR NECROSIS, UNSPECIFIED PANCREATITIS TYPE: Status: RESOLVED | Noted: 2021-04-05 | Resolved: 2022-11-25

## 2022-11-25 LAB
ALBUMIN SERPL-MCNC: 3.7 G/DL (ref 3.4–5)
ALP SERPL-CCNC: 68 U/L (ref 40–150)
ALT SERPL W P-5'-P-CCNC: 81 U/L (ref 0–70)
ANION GAP SERPL CALCULATED.3IONS-SCNC: 8 MMOL/L (ref 3–14)
AST SERPL W P-5'-P-CCNC: 30 U/L (ref 0–45)
BILIRUB SERPL-MCNC: 0.7 MG/DL (ref 0.2–1.3)
BUN SERPL-MCNC: 15 MG/DL (ref 7–30)
CALCIUM SERPL-MCNC: 8.6 MG/DL (ref 8.5–10.1)
CHLORIDE BLD-SCNC: 105 MMOL/L (ref 94–109)
CO2 SERPL-SCNC: 25 MMOL/L (ref 20–32)
CREAT SERPL-MCNC: 1.17 MG/DL (ref 0.66–1.25)
CREAT UR-MCNC: 333 MG/DL
GFR SERPL CREATININE-BSD FRML MDRD: 80 ML/MIN/1.73M2
GLUCOSE BLD-MCNC: 106 MG/DL (ref 70–99)
MICROALBUMIN UR-MCNC: 497 MG/L
MICROALBUMIN/CREAT UR: 149.25 MG/G CR (ref 0–17)
POTASSIUM BLD-SCNC: 3.9 MMOL/L (ref 3.4–5.3)
PROT SERPL-MCNC: 7.5 G/DL (ref 6.8–8.8)
SODIUM SERPL-SCNC: 138 MMOL/L (ref 133–144)

## 2022-11-25 PROCEDURE — 99213 OFFICE O/P EST LOW 20 MIN: CPT | Performed by: INTERNAL MEDICINE

## 2022-11-25 PROCEDURE — 80053 COMPREHEN METABOLIC PANEL: CPT | Performed by: INTERNAL MEDICINE

## 2022-11-25 PROCEDURE — 82043 UR ALBUMIN QUANTITATIVE: CPT | Performed by: INTERNAL MEDICINE

## 2022-11-25 PROCEDURE — 36415 COLL VENOUS BLD VENIPUNCTURE: CPT | Performed by: INTERNAL MEDICINE

## 2022-11-25 RX ORDER — LOSARTAN POTASSIUM 100 MG/1
100 TABLET ORAL EVERY EVENING
Qty: 90 TABLET | Refills: 3 | Status: SHIPPED | OUTPATIENT
Start: 2022-11-25 | End: 2024-01-12

## 2022-11-25 ASSESSMENT — PAIN SCALES - GENERAL: PAINLEVEL: NO PAIN (0)

## 2022-11-25 NOTE — PROGRESS NOTES
[facility] Internal Medicine Progress Note           Assessment and Plan:     Hypertension goal BP (blood pressure) < 140/90  - REVIEW OF HEALTH MAINTENANCE PROTOCOL ORDERS  - losartan (COZAAR) 100 MG tablet; Take 1 tablet (100 mg) by mouth every evening  - Albumin Random Urine Quantitative with Creat Ratio  - Comprehensive metabolic panel           Interval History:    Hypertension Follow-up      Outpatient blood pressures monitoring: no    Low Salt Diet: no    Adverse effects: N/A since patient ran out of supply.    Compliance: poor. Patient ran out of Amlodipine and Lisinopril, which led to uncontrolled BP.    Secondary causes: none    Chronic kidney disease: no    Hyperthyroidism: no    Anxiety: no    Decongestants: no    Substance abuse: no    Diabetes: unknown    Ischemic heart disease: unknown    Stroke: no    Hyperlipidemia: yes.                 Significant Problems:   Patient Active Problem List   Diagnosis     Obesity with BMI of 40.0-44.9, adult (H)     Hypertension goal BP (blood pressure) < 140/90              Review of Systems:   CONSTITUTIONAL: NEGATIVE for fever, chills, change in weight  INTEGUMENTARY/SKIN: NEGATIVE for worrisome rashes, moles or lesions  EYES: NEGATIVE for vision changes or irritation  ENT/MOUTH: NEGATIVE for ear, mouth and throat problems  RESP: NEGATIVE for significant cough or SOB  CV: NEGATIVE for chest pain, palpitations or peripheral edema  GI: NEGATIVE for nausea, abdominal pain, heartburn, or change in bowel habits  : NEGATIVE for frequency, dysuria, or hematuria  MUSCULOSKELETAL: NEGATIVE for significant arthralgias or myalgia  NEURO: NEGATIVE for weakness, dizziness or paresthesias  ENDOCRINE: NEGATIVE for temperature intolerance, skin/hair changes  HEME: NEGATIVE for bleeding problems  PSYCHIATRIC: NEGATIVE for changes in mood or affect               Physical Exam:   BP (!) 180/130 (BP Location: Right arm, Patient Position: Sitting, Cuff Size: Adult  "Large)   Pulse 85   Temp 97.6  F (36.4  C) (Tympanic)   Resp 18   Ht 1.803 m (5' 11\")   Wt (!) 151 kg (333 lb)   SpO2 97%   BMI 46.44 kg/m    Constitutional: Awake, alert, cooperative, no apparent distress, and appears stated age.  Eyes: extra-ocular muscles intact and sclera clear  ENT: normocepalic, without obvious abnormality  Lungs: no increased work of breathing, no retractions and clear to auscultation  Cardiovascular: regular rate and rhythm  Neurologic: Motor Exam:  moves all extremities well and symmetrically  Neuropsychiatric: Affect: normal          Data:   Epic reviewed.     Disposition:  Follow-up in 4 weeks.    Nadeem Kidd MD  Internal Medicine  [facility] Team    "

## 2022-11-25 NOTE — PATIENT INSTRUCTIONS
At [facility], we strive to deliver an exceptional experience to you, every time we see you. If you receive a survey, please complete it as we do value your feedback.  If you have MyChart, you can expect to receive results automatically within 24 hours of their completion.  Your provider will send a note interpreting your results as well.   If you do not have MyChart, you should receive your results in about a week by mail.    Your care team:                            Family Medicine Internal Medicine   MD Nadeem Bush MD Shantel Branch-Fleming, MD Srinivasa Vaka, MD Katya Belousova, PAJOSE C Abdullahi CNP, MD ([facility]   Ronan Cutler, MD Cami Singh MD Amelia Massimini APRN TALIB Peters APRN MD Reva Solis MD          Clinic hours: Monday - Thursday 7 am-6 pm; Fridays 7 am-5 pm.   Urgent care: Monday - Friday 10 am- 8 pm; Saturday and Sunday 9 am-5 pm.    Clinic: [phone]       [facility]: Monday - Thursday 8 am - 7 pm; Friday 8 am - 6 pm  [facility]: [phone]

## 2022-12-02 ENCOUNTER — OFFICE VISIT (OUTPATIENT)
Dept: FAMILY MEDICINE | Facility: CLINIC | Age: 42
End: 2022-12-02
Payer: COMMERCIAL

## 2022-12-02 VITALS
OXYGEN SATURATION: 97 % | WEIGHT: 315 LBS | TEMPERATURE: 98.4 F | RESPIRATION RATE: 16 BRPM | SYSTOLIC BLOOD PRESSURE: 150 MMHG | HEIGHT: 71 IN | BODY MASS INDEX: 44.1 KG/M2 | DIASTOLIC BLOOD PRESSURE: 110 MMHG | HEART RATE: 89 BPM

## 2022-12-02 DIAGNOSIS — I10 HTN, GOAL BELOW 140/90: Primary | ICD-10-CM

## 2022-12-02 DIAGNOSIS — N18.2 CHRONIC KIDNEY DISEASE, STAGE 2 (MILD): ICD-10-CM

## 2022-12-02 PROCEDURE — 99214 OFFICE O/P EST MOD 30 MIN: CPT | Performed by: INTERNAL MEDICINE

## 2022-12-02 RX ORDER — ATENOLOL 25 MG/1
25 TABLET ORAL AT BEDTIME
Qty: 90 TABLET | Refills: 1 | Status: SHIPPED | OUTPATIENT
Start: 2022-12-02 | End: 2023-07-07

## 2022-12-02 ASSESSMENT — PAIN SCALES - GENERAL: PAINLEVEL: NO PAIN (0)

## 2022-12-02 NOTE — PROGRESS NOTES
[facility] Internal Medicine Progress Note           Assessment and Plan:     HTN, goal below 140/90  Blood pressure is not adequately controlled with maximum dose of Losartan.  Add Atenolol 25 mg once daily.  - atenolol (TENORMIN) 25 MG tablet; Take 1 tablet (25 mg) by mouth At Bedtime Take with Losartan.    Chronic kidney disease, stage 2 (mild)  Secondary to uncontrolled hypertension.  - atenolol (TENORMIN) 25 MG tablet; Take 1 tablet (25 mg) by mouth At Bedtime Take with Losartan.           Interval History:     Jorge L is a 42 year old{presenting for the following health issues:    Hypertension Follow-up      Do you check your blood pressure regularly outside of the clinic? Yes     Are you following a low salt diet? Yes    Are your blood pressures ever more than 140 on the top number (systolic) OR more   than 90 on the bottom number (diastolic), for example 140/90? Yes              Significant Problems:   Patient Active Problem List   Diagnosis     Obesity with BMI of 40.0-44.9, adult (H)     Hypertension goal BP (blood pressure) < 140/90              Review of Systems:   CONSTITUTIONAL: NEGATIVE for fever, chills, change in weight  INTEGUMENTARY/SKIN: NEGATIVE for worrisome rashes, moles or lesions  EYES: NEGATIVE for vision changes or irritation  ENT/MOUTH: NEGATIVE for ear, mouth and throat problems  RESP: NEGATIVE for significant cough or SOB  CV: NEGATIVE for chest pain, palpitations or peripheral edema  GI: NEGATIVE for nausea, abdominal pain, heartburn, or change in bowel habits  : NEGATIVE for frequency, dysuria, or hematuria  MUSCULOSKELETAL: NEGATIVE for significant arthralgias or myalgia  NEURO: NEGATIVE for weakness, dizziness or paresthesias  ENDOCRINE: NEGATIVE for temperature intolerance, skin/hair changes  HEME: NEGATIVE for bleeding problems  PSYCHIATRIC: NEGATIVE for changes in mood or affect             Physical Exam:   BP (!) 150/110   Pulse 89   Temp 98.4  F (36.9  C) (Tympanic)  "  Resp 16   Ht 1.803 m (5' 11\")   Wt 149.2 kg (329 lb)   SpO2 97%   BMI 45.89 kg/m    Constitutional: Awake, alert, cooperative, no apparent distress, and appears stated age.  Eyes: extra-ocular muscles intact and sclera clear  ENT: normocepalic, without obvious abnormality  Lungs: no increased work of breathing, good air exchange, no retractions and clear to auscultation  Cardiovascular: regular rate and rhythm and normal S1 and S2  Musculoskeletal: no lower extremity pitting edema present  Neurologic: Mental Status Exam:  Orientation:   person, place, time  Neuropsychiatric: Normal affect, mood, orientation, memory and insight.          Data:   Epic reviewed.     Disposition:  Follow-up in one week.      Nadeem Kidd MD  Internal Medicine  [facility] Team    "

## 2022-12-09 ENCOUNTER — OFFICE VISIT (OUTPATIENT)
Dept: FAMILY MEDICINE | Facility: CLINIC | Age: 42
End: 2022-12-09
Payer: COMMERCIAL

## 2022-12-09 DIAGNOSIS — I10 HTN, GOAL BELOW 140/90: Primary | ICD-10-CM

## 2022-12-09 PROCEDURE — 99213 OFFICE O/P EST LOW 20 MIN: CPT | Performed by: INTERNAL MEDICINE

## 2022-12-09 ASSESSMENT — PAIN SCALES - GENERAL
PAINLEVEL: NO PAIN (0)
PAINLEVEL: NO PAIN (0)

## 2022-12-09 NOTE — PATIENT INSTRUCTIONS
At [facility], we strive to deliver an exceptional experience to you, every time we see you. If you receive a survey, please complete it as we do value your feedback.  If you have MyChart, you can expect to receive results automatically within 24 hours of their completion.  Your provider will send a note interpreting your results as well.   If you do not have MyChart, you should receive your results in about a week by mail.    Your care team:                            Family Medicine Internal Medicine   MD Nadeem Bush MD Shantel Branch-Fleming, MD Srinivasa Vaka, MD Katya Belousova, PAJOSE C Abdullahi CNP, MD ([facility]   Ronan Cutler, MD Cami Singh MD Amelia Massimini APRN TALIB Peters APRN MD Reva Solis MD          Clinic hours: Monday - Thursday 7 am-6 pm; Fridays 7 am-5 pm.   Urgent care: Monday - Friday 10 am- 8 pm; Saturday and Sunday 9 am-5 pm.    Clinic: [phone]       [facility]: Monday - Thursday 8 am - 7 pm; Friday 8 am - 6 pm  [facility]: [phone]

## 2022-12-09 NOTE — PROGRESS NOTES
[facility] Internal Medicine Progress Note           Assessment and Plan:     HTN, goal below 140/90  Blood pressures are finally under control after a second drug was started (Atenolol). Mr. Coelho was given Losartan 100 mg/day last 11-25-22 but blood pressure was still above goal. Diuretics not given due to possibility of urinary frequency (not practical for his occupation - ) and nondihydropyridine calcium channel blockers not started due to common side effect of peripheral edema. 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         Interval History:    Hypertension Follow-up      Outpatient blood pressures monitoring: yes    Low Salt Diet: no    Adverse effects: none from Losartan and Atenolol.    Compliance: good    Secondary causes: none    Chronic kidney disease: yes    Hyperthyroidism: no    Anxiety: no    Decongestants: no    Substance abuse: no    Diabetes: no    Ischemic heart disease: no    Stroke: no    Hyperlipidemia: yes (untreated)                 Significant Problems:   Patient Active Problem List   Diagnosis     Obesity with BMI of 40.0-44.9, adult (H)     HTN, goal below 140/90     Chronic kidney disease, stage 2 (mild)              Review of Systems:   CONSTITUTIONAL: NEGATIVE for fever, chills, change in weight  INTEGUMENTARY/SKIN: NEGATIVE for worrisome rashes, moles or lesions  EYES: NEGATIVE for vision changes or irritation  ENT/MOUTH: NEGATIVE for ear, mouth and throat problems  RESP: NEGATIVE for significant cough or SOB  CV: NEGATIVE for chest pain, palpitations or peripheral edema  GI: NEGATIVE for nausea, abdominal pain, heartburn, or change in bowel habits  : NEGATIVE for frequency, dysuria, or hematuria  MUSCULOSKELETAL: NEGATIVE for significant arthralgias or myalgia  NEURO: NEGATIVE for weakness, dizziness or paresthesias  ENDOCRINE: NEGATIVE for temperature intolerance, skin/hair changes  HEME: NEGATIVE for bleeding problems  PSYCHIATRIC: NEGATIVE for  "changes in mood or affect            Medications:     Current Outpatient Medications   Medication Sig     atenolol (TENORMIN) 25 MG tablet Take 1 tablet (25 mg) by mouth At Bedtime Take with Losartan.     losartan (COZAAR) 100 MG tablet Take 1 tablet (100 mg) by mouth every evening     No current facility-administered medications for this visit.             Physical Exam:   /88   Pulse 80   Temp 98  F (36.7  C) (Tympanic)   Resp 18   Ht 1.803 m (5' 11\")   Wt 149.3 kg (329 lb 3.2 oz)   SpO2 97%   BMI 45.91 kg/m    Constitutional: Awake, alert, cooperative, no apparent distress, and appears stated age.  Eyes: extra-ocular muscles intact and sclera clear  ENT: normocepalic, without obvious abnormality  Lungs: no increased work of breathing, no retractions and clear to auscultation  Cardiovascular: regular rate and rhythm  Musculoskeletal: no lower extremity pitting edema present  Neurologic: Motor Exam:  moves all extremities well and symmetrically  Neuropsychiatric: Normal affect, mood, orientation, memory and insight.          Data:     Component Ref Range & Units 1 mo ago 2 yr ago     Creatinine Urine mg/dL mg/dL 333  220     Albumin Urine mg/L mg/L 497  85     Albumin Urine mg/g Cr 0.00 - 17.00 mg/g Cr 149.25 High   38.73 High  R    Resulting Agency  [facility]              Specimen Collected: 11/25/22 11:17 AM Last Resulted: 11/25/22  7:49 PM             11/25/2022 12:39 PM     Component Value Flag Ref Range Units Status   Sodium 138   133 - 144 mmol/L Final   Potassium 3.9   3.4 - 5.3 mmol/L Final   Chloride 105   94 - 109 mmol/L Final   Carbon Dioxide (CO2) 25   20 - 32 mmol/L Final   Anion Gap 8   3 - 14 mmol/L Final   Urea Nitrogen 15   7 - 30 mg/dL Final   Creatinine 1.17   0.66 - 1.25 mg/dL Final   Calcium 8.6   8.5 - 10.1 mg/dL Final   Glucose 106   High   70 - 99 mg/dL Final   Alkaline Phosphatase 68   40 - 150 U/L Final   AST 30   0 - 45 U/L Final   ALT 81  "  High   0 - 70 U/L Final   Protein Total 7.5   6.8 - 8.8 g/dL Final   Albumin 3.7   3.4 - 5.0 g/dL Final   Bilirubin Total 0.7   0.2 - 1.3 mg/dL Final   GFR Estimate 80   >60 mL/min/1.73m2 Final            Disposition:      Nadeem Kidd MD  Internal Medicine  [facility] Team

## 2022-12-18 PROBLEM — N18.2 CHRONIC KIDNEY DISEASE, STAGE 2 (MILD): Status: ACTIVE | Noted: 2022-12-18

## 2022-12-26 VITALS
HEIGHT: 71 IN | DIASTOLIC BLOOD PRESSURE: 88 MMHG | BODY MASS INDEX: 44.1 KG/M2 | WEIGHT: 315 LBS | RESPIRATION RATE: 18 BRPM | TEMPERATURE: 98 F | SYSTOLIC BLOOD PRESSURE: 130 MMHG | HEART RATE: 80 BPM | OXYGEN SATURATION: 97 %

## 2023-07-06 DIAGNOSIS — N18.2 CHRONIC KIDNEY DISEASE, STAGE 2 (MILD): ICD-10-CM

## 2023-07-06 DIAGNOSIS — I10 HTN, GOAL BELOW 140/90: ICD-10-CM

## 2023-07-07 RX ORDER — ATENOLOL 25 MG/1
25 TABLET ORAL AT BEDTIME
Qty: 90 TABLET | Refills: 1 | Status: SHIPPED | OUTPATIENT
Start: 2023-07-07 | End: 2024-01-12

## 2024-01-11 DIAGNOSIS — I10 HTN, GOAL BELOW 140/90: ICD-10-CM

## 2024-01-11 DIAGNOSIS — N18.2 CHRONIC KIDNEY DISEASE, STAGE 2 (MILD): ICD-10-CM

## 2024-01-11 DIAGNOSIS — I10 HYPERTENSION GOAL BP (BLOOD PRESSURE) < 140/90: ICD-10-CM

## 2024-01-12 RX ORDER — ATENOLOL 25 MG/1
25 TABLET ORAL AT BEDTIME
Qty: 30 TABLET | Refills: 0 | Status: SHIPPED | OUTPATIENT
Start: 2024-01-12 | End: 2024-02-06

## 2024-01-12 RX ORDER — LOSARTAN POTASSIUM 100 MG/1
100 TABLET ORAL EVERY EVENING
Qty: 90 TABLET | Refills: 2 | Status: SHIPPED | OUTPATIENT
Start: 2024-01-12 | End: 2024-02-06

## 2024-01-12 NOTE — TELEPHONE ENCOUNTER
Last visit with this provider: 12/9/2022.    Upcoming visit with this provider: None.    Sadie refill sent.

## 2024-01-26 ENCOUNTER — TELEPHONE (OUTPATIENT)
Dept: FAMILY MEDICINE | Facility: CLINIC | Age: 44
End: 2024-01-26
Payer: COMMERCIAL

## 2024-01-28 ENCOUNTER — HEALTH MAINTENANCE LETTER (OUTPATIENT)
Age: 44
End: 2024-01-28

## 2024-02-06 ENCOUNTER — OFFICE VISIT (OUTPATIENT)
Dept: FAMILY MEDICINE | Facility: CLINIC | Age: 44
End: 2024-02-06
Payer: COMMERCIAL

## 2024-02-06 DIAGNOSIS — E66.01 MORBID OBESITY WITH BMI OF 50.0-59.9, ADULT (H): ICD-10-CM

## 2024-02-06 DIAGNOSIS — I10 HYPERTENSION GOAL BP (BLOOD PRESSURE) < 140/90: Primary | ICD-10-CM

## 2024-02-06 DIAGNOSIS — N18.2 CHRONIC KIDNEY DISEASE, STAGE 2 (MILD): ICD-10-CM

## 2024-02-06 LAB — HBA1C MFR BLD: 5.1 % (ref 0–5.6)

## 2024-02-06 PROCEDURE — 99214 OFFICE O/P EST MOD 30 MIN: CPT | Performed by: INTERNAL MEDICINE

## 2024-02-06 PROCEDURE — 36415 COLL VENOUS BLD VENIPUNCTURE: CPT | Performed by: INTERNAL MEDICINE

## 2024-02-06 PROCEDURE — 83036 HEMOGLOBIN GLYCOSYLATED A1C: CPT | Performed by: INTERNAL MEDICINE

## 2024-02-06 PROCEDURE — 80048 BASIC METABOLIC PNL TOTAL CA: CPT | Performed by: INTERNAL MEDICINE

## 2024-02-06 PROCEDURE — 82043 UR ALBUMIN QUANTITATIVE: CPT | Performed by: INTERNAL MEDICINE

## 2024-02-06 PROCEDURE — 82570 ASSAY OF URINE CREATININE: CPT | Performed by: INTERNAL MEDICINE

## 2024-02-06 RX ORDER — LOSARTAN POTASSIUM 100 MG/1
100 TABLET ORAL EVERY EVENING
Qty: 90 TABLET | Refills: 3 | Status: SHIPPED | OUTPATIENT
Start: 2024-02-06

## 2024-02-06 RX ORDER — ATENOLOL 25 MG/1
25 TABLET ORAL AT BEDTIME
Qty: 90 TABLET | Refills: 3 | Status: SHIPPED | OUTPATIENT
Start: 2024-02-06 | End: 2024-04-11

## 2024-02-06 RX ORDER — TOPIRAMATE 25 MG/1
25 TABLET, FILM COATED ORAL 2 TIMES DAILY
Qty: 180 TABLET | Refills: 3 | Status: SHIPPED | OUTPATIENT
Start: 2024-02-06 | End: 2024-04-11

## 2024-02-06 ASSESSMENT — PAIN SCALES - GENERAL: PAINLEVEL: NO PAIN (0)

## 2024-02-06 NOTE — PROGRESS NOTES
[facility] INTERNAL MEDICINE NOTE    Junito Coelho is a 43 year old male who presents to clinic today for the following health issues:     Hypertension Follow-up    Outpatient blood pressures monitoring: no  Low Salt Diet: no  Adverse effects: none  Compliance: good drug compliance.  Secondary causes: none  Chronic kidney disease: no  Hyperthyroidism: no  Anxiety: no  Decongestants: no  Substance abuse: no  Diabetes: no  Ischemic heart disease: unknown  Stroke: no  Hyperlipidemia: yes (untreated)       Patient Active Problem List   Diagnosis    Hypertension goal BP (blood pressure) < 140/90    Chronic kidney disease, stage 2 (mild)     Past Surgical History:   Procedure Laterality Date    LAPAROSCOPIC CHOLECYSTECTOMY N/A 6/19/2015    Procedure: LAPAROSCOPIC CHOLECYSTECTOMY;  Surgeon: John Yepez MD;  Location: [facility]    ORTHOPEDIC SURGERY      right hand       Social History     Tobacco Use    Smoking status: Some Days     Packs/day: 0.50     Years: 15.00     Additional pack years: 0.00     Total pack years: 7.50     Types: Cigarettes    Smokeless tobacco: Never   Substance Use Topics    Alcohol use: No     Alcohol/week: 0.0 standard drinks of alcohol     Family History   Problem Relation Age of Onset    Cerebrovascular Disease Mother 62        acute stroke with residual left sided hemiparesis    Hypertension Father     Coronary Artery Disease Father 56        heart attack at 56    Family History Negative Sister     Family History Negative Sister     Family History Negative Sister     Family History Negative Sister          Current Outpatient Medications   Medication Sig Dispense Refill    atenolol (TENORMIN) 25 MG tablet Take 1 tablet (25 mg) by mouth at bedtime Take with Losartan. 90 tablet 3    losartan (COZAAR) 100 MG tablet Take 1 tablet (100 mg) by mouth every evening 90 tablet 3    topiramate (TOPAMAX) 25 MG tablet Take 1 tablet (25  "mg) by mouth 2 times daily 180 tablet 3     No Known Allergies  Recent Labs   Lab Test 02/06/24  1521 11/25/22  1117 04/08/21  0650 04/07/21  0643 04/06/21  0632 04/05/21  1311 11/10/20  1443 09/25/20  0953   A1C 5.1  --   --   --   --   --   --  4.8   LDL  --   --   --   --   --  111*  --  72   HDL  --   --   --   --   --  30*  --  28*   TRIG  --   --   --   --   --  172*  --  264*   ALT  --  81* 242* 382*   < >  --    < > 94*   CR 1.22* 1.17 0.91 0.97   < >  --    < > 1.20   GFRESTIMATED 76 80 >90 >90   < >  --    < > 76   GFRESTBLACK  --   --  >90 >90   < >  --    < > 88   POTASSIUM 3.7 3.9 3.4 3.4   < >  --    < > 3.9   TSH  --   --   --   --   --   --   --  1.83    < > = values in this interval not displayed.      BP Readings from Last 3 Encounters:   02/06/24 139/88   12/09/22 130/88   12/02/22 (!) 150/110    Wt Readings from Last 3 Encounters:   02/06/24 (!) 160.4 kg (353 lb 9.6 oz)   12/09/22 149.3 kg (329 lb 3.2 oz)   12/02/22 149.2 kg (329 lb)                    ROS:  C: NEGATIVE for fever, chills, change in weight  I: NEGATIVE for worrisome rashes, moles or lesions  E: NEGATIVE for vision changes or irritation  E/M: NEGATIVE for ear, mouth and throat problems  R: NEGATIVE for significant cough or SOB  B: NEGATIVE for masses, tenderness or discharge  CV: NEGATIVE for chest pain, palpitations or peripheral edema  GI: NEGATIVE for nausea, abdominal pain, heartburn, or change in bowel habits  : NEGATIVE for frequency, dysuria, or hematuria  M: NEGATIVE for significant arthralgias or myalgia  N: NEGATIVE for weakness, dizziness or paresthesias  E: NEGATIVE for temperature intolerance, skin/hair changes  H: NEGATIVE for bleeding problems  P: NEGATIVE for changes in mood or affect    OBJECTIVE:                                                    /88   Pulse 70   Temp 97.4  F (36.3  C) (Oral)   Resp 15   Ht 1.778 m (5' 10\")   Wt (!) 160.4 kg (353 lb 9.6 oz)   SpO2 98%   BMI 50.74 kg/m     Body mass " index is 50.74 kg/m .  GENERAL: alert and no distress  EYES: Eyes grossly normal to inspection and conjunctivae and sclerae normal  HENT: normal cephalic/atraumatic and oral mucous membranes moist  RESP: lungs clear to auscultation - no rales, rhonchi or wheezes  CV: regular rates and rhythm and no peripheral edema  ABDOMEN: soft, nontender and bowel sounds normal  MS: no gross musculoskeletal defects noted, no edema  NEURO: Normal strength and tone, mentation intact and speech normal  PSYCH: mentation appears normal, affect normal/bright    Diagnostic Test Results:  Results for orders placed or performed in visit on 02/06/24   BASIC METABOLIC PANEL     Status: Abnormal   Result Value Ref Range    Sodium 137 135 - 145 mmol/L    Potassium 3.7 3.4 - 5.3 mmol/L    Chloride 102 98 - 107 mmol/L    Carbon Dioxide (CO2) 24 22 - 29 mmol/L    Anion Gap 11 7 - 15 mmol/L    Urea Nitrogen 16.2 6.0 - 20.0 mg/dL    Creatinine 1.22 (H) 0.67 - 1.17 mg/dL    GFR Estimate 76 >60 mL/min/1.73m2    Calcium 9.2 8.6 - 10.0 mg/dL    Glucose 116 (H) 70 - 99 mg/dL   Albumin Random Urine Quantitative with Creat Ratio     Status: Abnormal   Result Value Ref Range    Creatinine Urine mg/dL 176.0 mg/dL    Albumin Urine mg/L 79.8 mg/L    Albumin Urine mg/g Cr 45.34 (H) 0.00 - 17.00 mg/g Cr   Hemoglobin A1c     Status: Normal   Result Value Ref Range    Hemoglobin A1C 5.1 0.0 - 5.6 %        ASSESSMENT/PLAN:                                                      Hypertension goal BP (blood pressure) < 140/90  - losartan (COZAAR) 100 MG tablet; Take 1 tablet (100 mg) by mouth every evening    Chronic kidney disease, stage 2 (mild)  - BASIC METABOLIC PANEL  - Albumin Random Urine Quantitative with Creat Ratio  - atenolol (TENORMIN) 25 MG tablet; Take 1 tablet (25 mg) by mouth at bedtime Take with Losartan.    Morbid obesity with BMI of 50.0-59.9, adult (H)  - topiramate (TOPAMAX) 25 MG tablet; Take 1 tablet (25 mg) by mouth 2 times daily  - Hemoglobin  A1c     Disposition:  Follow-up in 24 weeks or as needed.    Nadeem Kidd MD  [facility]

## 2024-02-06 NOTE — LETTER
"February 15, 2024      Jorge L Coelho  [address] [facility] [address]      Dear Mr. Coelho,     Serum creatinine is mildly due to Losartan use. But urine albumin is improving, which is indicates of improving blood pressure. You do not have diabetes. Electrolytes (\"minerals\" such as potassium, chloride, sodium and calcium) are normal. For any questions, you may call my office at [phone].     Sincerely,     Nadeem Kidd MD   Internal Medicine     Resulted Orders   BASIC METABOLIC PANEL   Result Value Ref Range    Sodium 137 135 - 145 mmol/L      Comment:      Reference intervals for this test were updated on 09/26/2023 to more accurately reflect our healthy population. There may be differences in the flagging of prior results with similar values performed with this method. Interpretation of those prior results can be made in the context of the updated reference intervals.     Potassium 3.7 3.4 - 5.3 mmol/L    Chloride 102 98 - 107 mmol/L    Carbon Dioxide (CO2) 24 22 - 29 mmol/L    Anion Gap 11 7 - 15 mmol/L    Urea Nitrogen 16.2 6.0 - 20.0 mg/dL    Creatinine 1.22 (H) 0.67 - 1.17 mg/dL    GFR Estimate 76 >60 mL/min/1.73m2    Calcium 9.2 8.6 - 10.0 mg/dL    Glucose 116 (H) 70 - 99 mg/dL   Albumin Random Urine Quantitative with Creat Ratio   Result Value Ref Range    Creatinine Urine mg/dL 176.0 mg/dL      Comment:      The reference ranges have not been established in urine creatinine. The results should be integrated into the clinical context for interpretation.    Albumin Urine mg/L 79.8 mg/L      Comment:      The reference ranges have not been established in urine albumin. The results should be integrated into the clinical context for interpretation.    Albumin Urine mg/g Cr 45.34 (H) 0.00 - 17.00 mg/g Cr      Comment:      Microalbuminuria is defined as an albumin:creatinine ratio of 17 to 299 for males and 25 to 299 for females. A ratio of albumin:creatinine of 300 or higher is indicative of overt " proteinuria.  Due to biologic variability, positive results should be confirmed by a second, first-morning random or 24-hour timed urine specimen. If there is discrepancy, a third specimen is recommended. When 2 out of 3 results are in the microalbuminuria range, this is evidence for incipient nephropathy and warrants increased efforts at glucose control, blood pressure control, and institution of therapy with an angiotensin-converting-enzyme (ACE) inhibitor (if the patient can tolerate it).     Hemoglobin A1c   Result Value Ref Range    Hemoglobin A1C 5.1 0.0 - 5.6 %      Comment:      Normal <5.7%   Prediabetes 5.7-6.4%    Diabetes 6.5% or higher     Note: Adopted from ADA consensus guidelines.

## 2024-02-07 LAB
ANION GAP SERPL CALCULATED.3IONS-SCNC: 11 MMOL/L (ref 7–15)
BUN SERPL-MCNC: 16.2 MG/DL (ref 6–20)
CALCIUM SERPL-MCNC: 9.2 MG/DL (ref 8.6–10)
CHLORIDE SERPL-SCNC: 102 MMOL/L (ref 98–107)
CREAT SERPL-MCNC: 1.22 MG/DL (ref 0.67–1.17)
CREAT UR-MCNC: 176 MG/DL
DEPRECATED HCO3 PLAS-SCNC: 24 MMOL/L (ref 22–29)
EGFRCR SERPLBLD CKD-EPI 2021: 76 ML/MIN/1.73M2
GLUCOSE SERPL-MCNC: 116 MG/DL (ref 70–99)
MICROALBUMIN UR-MCNC: 79.8 MG/L
MICROALBUMIN/CREAT UR: 45.34 MG/G CR (ref 0–17)
POTASSIUM SERPL-SCNC: 3.7 MMOL/L (ref 3.4–5.3)
SODIUM SERPL-SCNC: 137 MMOL/L (ref 135–145)

## 2024-02-14 VITALS
HEART RATE: 70 BPM | WEIGHT: 315 LBS | BODY MASS INDEX: 45.1 KG/M2 | HEIGHT: 70 IN | DIASTOLIC BLOOD PRESSURE: 88 MMHG | OXYGEN SATURATION: 98 % | RESPIRATION RATE: 15 BRPM | SYSTOLIC BLOOD PRESSURE: 139 MMHG | TEMPERATURE: 97.4 F

## 2024-02-14 PROBLEM — I10 HYPERTENSION GOAL BP (BLOOD PRESSURE) < 140/90: Status: ACTIVE | Noted: 2022-11-25

## 2024-04-11 ENCOUNTER — OFFICE VISIT (OUTPATIENT)
Dept: FAMILY MEDICINE | Facility: CLINIC | Age: 44
End: 2024-04-11
Payer: COMMERCIAL

## 2024-04-11 VITALS
SYSTOLIC BLOOD PRESSURE: 154 MMHG | RESPIRATION RATE: 20 BRPM | OXYGEN SATURATION: 98 % | BODY MASS INDEX: 44.1 KG/M2 | DIASTOLIC BLOOD PRESSURE: 105 MMHG | HEART RATE: 82 BPM | WEIGHT: 315 LBS | TEMPERATURE: 97.8 F | HEIGHT: 71 IN

## 2024-04-11 DIAGNOSIS — I10 HTN, GOAL BELOW 140/90: ICD-10-CM

## 2024-04-11 DIAGNOSIS — Z00.00 ENCOUNTER FOR ANNUAL PHYSICAL EXAM: Primary | ICD-10-CM

## 2024-04-11 DIAGNOSIS — N18.2 CHRONIC KIDNEY DISEASE, STAGE 2 (MILD): ICD-10-CM

## 2024-04-11 DIAGNOSIS — E66.01 MORBID OBESITY (H): ICD-10-CM

## 2024-04-11 PROCEDURE — 82947 ASSAY GLUCOSE BLOOD QUANT: CPT | Performed by: INTERNAL MEDICINE

## 2024-04-11 PROCEDURE — 36415 COLL VENOUS BLD VENIPUNCTURE: CPT | Performed by: INTERNAL MEDICINE

## 2024-04-11 PROCEDURE — 99396 PREV VISIT EST AGE 40-64: CPT | Performed by: INTERNAL MEDICINE

## 2024-04-11 PROCEDURE — 82565 ASSAY OF CREATININE: CPT | Performed by: INTERNAL MEDICINE

## 2024-04-11 PROCEDURE — 84460 ALANINE AMINO (ALT) (SGPT): CPT | Performed by: INTERNAL MEDICINE

## 2024-04-11 RX ORDER — NEBIVOLOL 5 MG/1
5 TABLET ORAL DAILY
Qty: 90 TABLET | Refills: 3 | Status: SHIPPED | OUTPATIENT
Start: 2024-04-11

## 2024-04-11 SDOH — HEALTH STABILITY: PHYSICAL HEALTH: ON AVERAGE, HOW MANY MINUTES DO YOU ENGAGE IN EXERCISE AT THIS LEVEL?: 0 MIN

## 2024-04-11 SDOH — HEALTH STABILITY: PHYSICAL HEALTH: ON AVERAGE, HOW MANY DAYS PER WEEK DO YOU ENGAGE IN MODERATE TO STRENUOUS EXERCISE (LIKE A BRISK WALK)?: 0 DAYS

## 2024-04-11 ASSESSMENT — SOCIAL DETERMINANTS OF HEALTH (SDOH): HOW OFTEN DO YOU GET TOGETHER WITH FRIENDS OR RELATIVES?: ONCE A WEEK

## 2024-04-11 ASSESSMENT — PAIN SCALES - GENERAL: PAINLEVEL: NO PAIN (0)

## 2024-04-11 NOTE — LETTER
April 15, 2024      Jorge L Coelho  [address] [facility] [address]      Dear Mr. Coelho,     Kidney function is slightly worse due to your blood pressure. Let's still continue your medications, including the new drugs such as Bystolic and Jardiance. Let's continue Losartan. Liver function is improving. Lastly, you are not diabetic. For any questions, you may call my office at [phone].     Sincerely,     Nadeem Kidd MD   Internal Medicine     Resulted Orders   Creatinine   Result Value Ref Range    Creatinine 1.33 (H) 0.67 - 1.17 mg/dL    GFR Estimate 68 >60 mL/min/1.73m2   Glucose   Result Value Ref Range    Glucose 96 70 - 99 mg/dL    Patient Fasting > 8hrs? No    ALT   Result Value Ref Range    ALT 78 (H) 0 - 70 U/L

## 2024-04-11 NOTE — PROGRESS NOTES
Preventive Care Visit  [facility]  Nadeem Kidd MD, Internal Medicine  Apr 11, 2024  {Provider  Link to [facility] :878945}    {PROVIDER CHARTING PREFERENCE:639680}    Subjective   Jorge L is a 43 year old, presenting for the following:  Physical        4/11/2024     2:08 PM   Additional Questions   Roomed by madeleine         4/11/2024     2:08 PM   Patient Reported Additional Medications   Patient reports taking the following new medications none        Health Care Directive  Patient does not have a Health Care Directive or Living Will: {ADVANCE_DIRECTIVE_STATUS:576917}    HPI  ***  {MA/LPN/RN Pre-Provider Visit Orders- hCG/UA/Strep (Optional):101696}  {SUPERLIST (Optional):722584}  {additonal problems for provider to add (Optional):098545}      4/11/2024   General Health   How would you rate your overall physical health? Good   Feel stress (tense, anxious, or unable to sleep) Not at all         4/11/2024   Nutrition   Three or more servings of calcium each day? (!) NO   Diet: Regular (no restrictions)   How many servings of fruit and vegetables per day? (!) 0-1   How many sweetened beverages each day? (!) 2         4/11/2024   Exercise   Days per week of moderate/strenous exercise 0 days   Average minutes spent exercising at this level 0 min   (!) EXERCISE CONCERN      4/11/2024   Social Factors   Frequency of gathering with friends or relatives Once a week   Worry food won't last until get money to buy more No   Food not last or not have enough money for food? No   Do you have housing?  Yes   Are you worried about losing your housing? No   Lack of transportation? No   Unable to get utilities (heat,electricity)? No         4/11/2024   Dental   Dentist two times every year? (!) NO         4/11/2024   TB Screening   Were you born outside of the [place]? No         Today's PHQ-2 Score:       4/11/2024     2:07 PM   PHQ-2 ( 1999 [employer])   Q1: Little interest or pleasure in doing things 0   Q2:  Feeling down, depressed or hopeless 0   PHQ-2 Score 0   Q1: Little interest or pleasure in doing things Not at all   Q2: Feeling down, depressed or hopeless Not at all   PHQ-2 Score 0           4/11/2024   Substance Use   If I could quit smoking, I would Completely agree   I want to quit somking, worry about health affects Completely agree   Willing to make a plan to quit smoking Completely agree   Willing to cut down before quitting Completely agree   Alcohol more than 3/day or more than 7/wk No   Do you use any other substances recreationally? No     Social History     Tobacco Use    Smoking status: Some Days     Current packs/day: 0.50     Average packs/day: 0.5 packs/day for 15.0 years (7.5 ttl pk-yrs)     Types: Cigarettes    Smokeless tobacco: Never   Vaping Use    Vaping status: Never Used   Substance Use Topics    Alcohol use: No     Alcohol/week: 0.0 standard drinks of alcohol    Drug use: No     {Provider  If there are gaps in the social history shown above, please follow the link to update and then refresh the note Link to Social and Substance History :248974}      4/11/2024   STI Screening   New sexual partner(s) since last STI/HIV test? No   ASCVD Risk   The 10-year ASCVD risk score (David RODRIGUEZ, et al., 2019) is: 10.8%    Values used to calculate the score:      Age: 43 years      Sex: Male      Is Non- : No      Diabetic: No      Tobacco smoker: Yes      Systolic Blood Pressure: 154 mmHg      Is BP treated: Yes      HDL Cholesterol: 30 mg/dL      Total Cholesterol: 175 mg/dL        4/11/2024   Contraception/Family Planning   Questions about contraception or family planning No     {Provider  Use the storyboard to review patient history, after sections have been marked as reviewed, refresh note to capture documentation:712930}   Reviewed and updated as needed this visit by Provider                    {HISTORY OPTIONS (Optional):644252}    {ROS Picklists  "(Optional):497257}     Objective    Exam  BP (!) 154/105 (BP Location: Right arm, Patient Position: Sitting, Cuff Size: Adult Large)   Pulse 82   Temp 97.8  F (36.6  C) (Tympanic)   Resp 20   Ht 1.791 m (5' 10.5\")   Wt (!) 162.4 kg (358 lb)   SpO2 98%   BMI 50.64 kg/m     Estimated body mass index is 50.64 kg/m  as calculated from the following:    Height as of this encounter: 1.791 m (5' 10.5\").    Weight as of this encounter: 162.4 kg (358 lb).    Physical Exam  {Exam Choices (Optional):194242}        Signed Electronically by: Nadeem Kidd MD  {Email feedback regarding this note to [email or web address]   :894204}  "

## 2024-04-11 NOTE — COMMUNITY RESOURCES LIST (ENGLISH)
April 11, 2024           YOUR PERSONALIZED LIST OF SERVICES & PROGRAMS           & RECREATION    Sports      [employer] - Summer [employer]  [address] [place] [address] (Distance: 3.7 miles)  Phone: [phone]  Website: [email or web address]_[email or web address]  Language: English  Fee: Self pay      [place] - Sports clubs and recreational activities - [employer] [facility]  [address] [place] [address] (Distance: 3.7 miles)  Language: English  Fee: Self pay, Sliding scale      [facility] - Adult Enrichment  Phone: [phone]  Website: [email or web address]-[email or web address]  Language: English  Hours: Mon 7:30 AM - 4:00 PM Tue 7:30 AM - 4:00 PM Wed 7:30 AM - 4:00 PM Thu 7:30 AM - 4:00 PM Fri 7:30 AM - 4:00 PM    Classes/Groups      [employer] - Group Exercise Classes  [id number] [place] [address] (Distance: 3.7 miles)  Phone: [phone]  Website: [email or web address]__[email or web address]_group_[email or web address]_classes  Language: English  Fee: Self pay      [employer] - [employer]  [address] [place] [address] (Distance: 3.7 miles)  Phone: [phone]  Website: [email or web address]/[email or web address]-[email or web address]-[email or web address]  Language: English      [facility] - Care Coordination (Healthcare only)  Phone: [phone]  Website: [email or web address]  Language: English, Chilean  Hours: Wed 9:00 AM - 11:30 AM Thu 1:00 PM - 4:00 PM, 5:30 PM - 7:00 PM               IMPORTANT NUMBERS & WEBSITES        Emergency Services  911  .   [facility]  211 [email or web address].org  .   Poison Control  (849) 802-6406 http://mnpoison.org http://wisconsinpoison.org  .     Suicide and Crisis Lifeline  988 http://988lifeline.org  .   Childhelp National Child Abuse Hotline  792.104.3784 http://Childhelphotline.org   .   National Sexual Assault  Hotline  (123) 432-7282 (HOPE) http://Basecampn.WellGen   .     National Runaway Safeline  (614) 611-5299 (RUNAWAY) http://LiveTop.WellGen  .   Pregnancy & Postpartum Support  Call/text 595-536-2036  [place]: http://ppsupportmn.org  [place]: http://psichapters.com/wi  .   Substance Abuse National Helpline ([facility])  391-108-HELP (9132) http://Findtreatment.gov   .                DISCLAIMER: These resources have been generated via the [employer]. [employer] does not endorse any service providers mentioned in this resource list. [employer] does not guarantee that the services mentioned in this resource list will be available to you or will improve your health or wellness.    [facility]

## 2024-04-12 LAB
ALT SERPL W P-5'-P-CCNC: 78 U/L (ref 0–70)
CREAT SERPL-MCNC: 1.33 MG/DL (ref 0.67–1.17)
EGFRCR SERPLBLD CKD-EPI 2021: 68 ML/MIN/1.73M2
FASTING STATUS PATIENT QL REPORTED: NO
GLUCOSE SERPL-MCNC: 96 MG/DL (ref 70–99)

## 2024-04-14 NOTE — PROGRESS NOTES
SUBJECTIVE:   Jorge L is a 43 year old, presenting for the following:    Today's PHQ-2 Score:       4/11/2024     2:07 PM   PHQ-2 ( 1999 [employer])   Q1: Little interest or pleasure in doing things 0   Q2: Feeling down, depressed or hopeless 0   PHQ-2 Score 0   Q1: Little interest or pleasure in doing things Not at all   Q2: Feeling down, depressed or hopeless Not at all   PHQ-2 Score 0     Health Care Directive  Patient does not have a Health Care Directive or Living Will:  Not discussed during this visit.        4/11/2024   General Health   How would you rate your overall physical health? Good   Feel stress (tense, anxious, or unable to sleep) Not at all         4/11/2024   Nutrition   Three or more servings of calcium each day? (!) NO   Diet: Regular (no restrictions)   How many servings of fruit and vegetables per day? (!) 0-1   How many sweetened beverages each day? (!) 2         4/11/2024   Exercise   Days per week of moderate/strenous exercise 0 days   Average minutes spent exercising at this level 0 min   (!) EXERCISE CONCERN      4/11/2024   Social Factors   Frequency of gathering with friends or relatives Once a week   Worry food won't last until get money to buy more No   Food not last or not have enough money for food? No   Do you have housing?  Yes   Are you worried about losing your housing? No   Lack of transportation? No   Unable to get utilities (heat,electricity)? No         4/11/2024   Dental   Dentist two times every year? (!) NO         4/11/2024   TB Screening   Were you born outside of the [place]? No           4/11/2024   Substance Use   If I could quit smoking, I would Completely agree   I want to quit somking, worry about health affects Completely agree   Willing to make a plan to quit smoking Completely agree   Willing to cut down before quitting Completely agree   Alcohol more than 3/day or more than 7/wk No   Do you use any other substances recreationally? No     Social History     Tobacco Use     Smoking status: Some Days     Current packs/day: 0.50     Average packs/day: 0.5 packs/day for 15.0 years (7.5 ttl pk-yrs)     Types: Cigarettes    Smokeless tobacco: Never   Vaping Use    Vaping status: Never Used   Substance Use Topics    Alcohol use: No     Alcohol/week: 0.0 standard drinks of alcohol    Drug use: No         4/11/2024   STI Screening   New sexual partner(s) since last STI/HIV test? No           4/11/2024   Contraception/Family Planning   Questions about contraception or family planning No     Lab work is in process  BP Readings from Last 3 Encounters:   04/11/24 (!) 154/105   02/06/24 139/88   12/09/22 130/88    Wt Readings from Last 3 Encounters:   04/11/24 (!) 162.4 kg (358 lb)   02/06/24 (!) 160.4 kg (353 lb 9.6 oz)   12/09/22 149.3 kg (329 lb 3.2 oz)                  Patient Active Problem List   Diagnosis    Hypertension goal BP (blood pressure) < 140/90    Chronic kidney disease, stage 2 (mild)     Past Surgical History:   Procedure Laterality Date    LAPAROSCOPIC CHOLECYSTECTOMY N/A 6/19/2015    Procedure: LAPAROSCOPIC CHOLECYSTECTOMY;  Surgeon: John Yepez MD;  Location: [facility]    ORTHOPEDIC SURGERY      right hand       Social History     Tobacco Use    Smoking status: Some Days     Current packs/day: 0.50     Average packs/day: 0.5 packs/day for 15.0 years (7.5 ttl pk-yrs)     Types: Cigarettes    Smokeless tobacco: Never   Substance Use Topics    Alcohol use: No     Alcohol/week: 0.0 standard drinks of alcohol     Family History   Problem Relation Age of Onset    Cerebrovascular Disease Mother 62        acute stroke with residual left sided hemiparesis    Hypertension Father     Coronary Artery Disease Father 56        heart attack at 56    Family History Negative Sister     Family History Negative Sister     Family History Negative Sister     Family History Negative Sister          No Known Allergies  Recent Labs   Lab Test 04/11/24  1518 02/06/24  1521 11/25/22  1117  "11/25/22  1117 04/08/21  0650 04/07/21  0643 04/06/21  0632 04/05/21  1311 11/10/20  1443 09/25/20  0953   A1C  --  5.1  --   --   --   --   --   --   --  4.8   LDL  --   --   --   --   --   --   --  111*  --  72   HDL  --   --   --   --   --   --   --  30*  --  28*   TRIG  --   --   --   --   --   --   --  172*  --  264*   ALT 78*  --   --  81* 242* 382*   < >  --    < > 94*   CR 1.33* 1.22*   < > 1.17 0.91 0.97   < >  --    < > 1.20   GFRESTIMATED 68 76   < > 80 >90 >90   < >  --    < > 76   GFRESTBLACK  --   --   --   --  >90 >90   < >  --    < > 88   POTASSIUM  --  3.7  --  3.9 3.4 3.4   < >  --    < > 3.9   TSH  --   --   --   --   --   --   --   --   --  1.83    < > = values in this interval not displayed.         Last PSA: No results found for: \"PSA\"    Reviewed orders with patient. Reviewed health maintenance and updated orders accordingly - Yes      Reviewed and updated as needed this visit by clinical staff   Tobacco  Allergies  Meds              Reviewed and updated as needed this visit by Provider     Meds                Review of Systems  CONSTITUTIONAL:POSITIVE  for morbid obesity and NEGATIVE  for chills, fatigue, malaise, and myalgias  INTEGUMENTARY/SKIN: NEGATIVE for worrisome rashes, moles or lesions  EYES: NEGATIVE for vision changes or irritation  ENT/MOUTH: NEGATIVE for ear, mouth and throat problems  RESP: NEGATIVE for significant cough or SOB  CV: NEGATIVE for chest pain, palpitations or peripheral edema  GI: NEGATIVE for nausea, abdominal pain, heartburn, or change in bowel habits  : NEGATIVE for frequency, dysuria, or hematuria  MUSCULOSKELETAL: NEGATIVE for significant arthralgias or myalgia  NEURO: NEGATIVE for weakness, dizziness or paresthesias  ENDOCRINE: NEGATIVE for temperature intolerance, skin/hair changes  HEME: NEGATIVE for bleeding problems  PSYCHIATRIC: NEGATIVE for changes in mood or affect    OBJECTIVE:   BP (!) 154/105 (BP Location: Right arm, Patient Position: Sitting, " "Cuff Size: Adult Large)   Pulse 82   Temp 97.8  F (36.6  C) (Tympanic)   Resp 20   Ht 1.791 m (5' 10.5\")   Wt (!) 162.4 kg (358 lb)   SpO2 98%   BMI 50.64 kg/m     Estimated body mass index is 50.64 kg/m  as calculated from the following:    Height as of this encounter: 1.791 m (5' 10.5\").    Weight as of this encounter: 162.4 kg (358 lb).  Physical Exam  GENERAL: alert and no distress  EYES: Eyes grossly normal to inspection and conjunctivae and sclerae normal  HENT: normal cephalic/atraumatic and oral mucous membranes moist  NECK: no adenopathy and thyroid normal to palpation  RESP: lungs clear to auscultation - no rales, rhonchi or wheezes  CV: regular rates and rhythm, no murmur, click or rub, and edema on both lower extremities.  ABDOMEN: soft, nontender and bowel sounds normal  MS: Minimal edema on both lower legs.  NEURO: Normal strength and tone, mentation intact and speech normal  PSYCH: mentation appears normal, affect normal/bright    Diagnostic Test Results:  Results for orders placed or performed in visit on 04/11/24   Creatinine     Status: Abnormal   Result Value Ref Range    Creatinine 1.33 (H) 0.67 - 1.17 mg/dL    GFR Estimate 68 >60 mL/min/1.73m2   Glucose     Status: None   Result Value Ref Range    Glucose 96 70 - 99 mg/dL    Patient Fasting > 8hrs? No    ALT     Status: Abnormal   Result Value Ref Range    ALT 78 (H) 0 - 70 U/L       ASSESSMENT/PLAN:     Encounter for annual physical exam  - Glucose  - ALT    HTN, goal below 140/90  - nebivolol (BYSTOLIC) 5 MG tablet; Take 1 tablet (5 mg) by mouth daily    Chronic kidney disease, stage 2 (mild)  - empagliflozin (JARDIANCE) 10 MG TABS tablet; Take 1 tablet (10 mg) by mouth daily  - Creatinine    Morbid obesity (H)  Predisposing factor to hypertension.      Patient has been advised of split billing requirements and indicates understanding: Yes      Counseling  Special attention given to:        Healthy diet/nutrition       Vision " "screening       The 10-year ASCVD risk score (David RODRIGUEZ, et al., 2019) is: 10.8%    Values used to calculate the score:      Age: 43 years      Sex: Male      Is Non- : No      Diabetic: No      Tobacco smoker: Yes      Systolic Blood Pressure: 154 mmHg      Is BP treated: Yes      HDL Cholesterol: 30 mg/dL      Total Cholesterol: 175 mg/dL      BMI  Estimated body mass index is 50.64 kg/m  as calculated from the following:    Height as of this encounter: 1.791 m (5' 10.5\").    Weight as of this encounter: 162.4 kg (358 lb).   Weight management plan: diet and exercise.      He reports that he has been smoking cigarettes. He has a 7.5 pack-year smoking history. He has never used smokeless tobacco.  Nicotine/Tobacco Cessation Plan  Defer to next visit.            Signed Electronically by: Nadeem Kidd MD  "

## 2024-06-03 ENCOUNTER — MYC REFILL (OUTPATIENT)
Dept: FAMILY MEDICINE | Facility: CLINIC | Age: 44
End: 2024-06-03
Payer: COMMERCIAL

## 2024-06-03 ENCOUNTER — MYC MEDICAL ADVICE (OUTPATIENT)
Dept: FAMILY MEDICINE | Facility: CLINIC | Age: 44
End: 2024-06-03
Payer: COMMERCIAL

## 2024-06-03 DIAGNOSIS — I10 HYPERTENSION GOAL BP (BLOOD PRESSURE) < 140/90: ICD-10-CM

## 2024-06-03 DIAGNOSIS — I10 HTN, GOAL BELOW 140/90: ICD-10-CM

## 2024-06-03 RX ORDER — LOSARTAN POTASSIUM 100 MG/1
100 TABLET ORAL EVERY EVENING
Qty: 90 TABLET | Refills: 3 | OUTPATIENT
Start: 2024-06-03

## 2024-06-03 RX ORDER — NEBIVOLOL 5 MG/1
5 TABLET ORAL DAILY
Qty: 90 TABLET | Refills: 3 | OUTPATIENT
Start: 2024-06-03

## 2024-07-03 ENCOUNTER — TELEPHONE (OUTPATIENT)
Dept: FAMILY MEDICINE | Facility: CLINIC | Age: 44
End: 2024-07-03
Payer: COMMERCIAL

## 2024-07-03 NOTE — TELEPHONE ENCOUNTER
Patient Quality Outreach    Patient is due for the following:   Hypertension -  BP check and Hypertension follow-up visit      Topic Date Due    Pneumococcal Vaccine (1 of 2 - PCV) Never done    Hepatitis B Vaccine (1 of 3 - 19+ 3-dose series) Never done    COVID-19 Vaccine (4 - 2023-24 season) 09/01/2023       Next Steps:   Schedule a office visit for blood pressure follow up and immunization update.     Type of outreach:    Sent [employer] message.      Questions for provider review:    None           Pia Kelsey MA

## 2024-09-30 ENCOUNTER — APPOINTMENT (OUTPATIENT)
Dept: CT IMAGING | Facility: CLINIC | Age: 44
End: 2024-09-30
Attending: EMERGENCY MEDICINE
Payer: COMMERCIAL

## 2024-09-30 ENCOUNTER — HOSPITAL ENCOUNTER (INPATIENT)
Facility: CLINIC | Age: 44
LOS: 2 days | Discharge: HOME | End: 2024-10-02
Attending: EMERGENCY MEDICINE | Admitting: STUDENT IN AN ORGANIZED HEALTH CARE EDUCATION/TRAINING PROGRAM
Payer: COMMERCIAL

## 2024-09-30 ENCOUNTER — ANESTHESIA EVENT (OUTPATIENT)
Dept: SURGERY | Facility: CLINIC | Age: 44
End: 2024-09-30
Payer: COMMERCIAL

## 2024-09-30 DIAGNOSIS — K46.0 INCARCERATED HERNIA: Primary | ICD-10-CM

## 2024-09-30 DIAGNOSIS — G89.18 ACUTE POST-OPERATIVE PAIN: ICD-10-CM

## 2024-09-30 DIAGNOSIS — K56.609 SMALL BOWEL OBSTRUCTION (H): ICD-10-CM

## 2024-09-30 LAB
ALBUMIN SERPL BCG-MCNC: 4.6 G/DL (ref 3.5–5.2)
ALP SERPL-CCNC: 75 U/L (ref 40–150)
ALT SERPL W P-5'-P-CCNC: 64 U/L (ref 0–70)
ANION GAP SERPL CALCULATED.3IONS-SCNC: 19 MMOL/L (ref 7–15)
AST SERPL W P-5'-P-CCNC: 36 U/L (ref 0–45)
BASOPHILS # BLD AUTO: 0.1 10E3/UL (ref 0–0.2)
BASOPHILS NFR BLD AUTO: 1 %
BILIRUB SERPL-MCNC: 0.8 MG/DL
BUN SERPL-MCNC: 16 MG/DL (ref 6–20)
CALCIUM SERPL-MCNC: 9.8 MG/DL (ref 8.8–10.4)
CHLORIDE SERPL-SCNC: 97 MMOL/L (ref 98–107)
CREAT SERPL-MCNC: 1.19 MG/DL (ref 0.67–1.17)
EGFRCR SERPLBLD CKD-EPI 2021: 78 ML/MIN/1.73M2
EOSINOPHIL # BLD AUTO: 0 10E3/UL (ref 0–0.7)
EOSINOPHIL NFR BLD AUTO: 0 %
ERYTHROCYTE [DISTWIDTH] IN BLOOD BY AUTOMATED COUNT: 12.3 % (ref 10–15)
GLUCOSE SERPL-MCNC: 193 MG/DL (ref 70–99)
HCO3 SERPL-SCNC: 21 MMOL/L (ref 22–29)
HCT VFR BLD AUTO: 54.5 % (ref 40–53)
HGB BLD-MCNC: 19.4 G/DL (ref 13.3–17.7)
HOLD SPECIMEN: NORMAL
HOLD SPECIMEN: NORMAL
IMM GRANULOCYTES # BLD: 0.1 10E3/UL
IMM GRANULOCYTES NFR BLD: 0 %
LACTATE SERPL-SCNC: 1.8 MMOL/L (ref 0.7–2)
LACTATE SERPL-SCNC: 2.5 MMOL/L (ref 0.7–2)
LIPASE SERPL-CCNC: 31 U/L (ref 13–60)
LYMPHOCYTES # BLD AUTO: 1.2 10E3/UL (ref 0.8–5.3)
LYMPHOCYTES NFR BLD AUTO: 9 %
MCH RBC QN AUTO: 31.1 PG (ref 26.5–33)
MCHC RBC AUTO-ENTMCNC: 35.6 G/DL (ref 31.5–36.5)
MCV RBC AUTO: 88 FL (ref 78–100)
MONOCYTES # BLD AUTO: 0.5 10E3/UL (ref 0–1.3)
MONOCYTES NFR BLD AUTO: 4 %
NEUTROPHILS # BLD AUTO: 12 10E3/UL (ref 1.6–8.3)
NEUTROPHILS NFR BLD AUTO: 87 %
NRBC # BLD AUTO: 0 10E3/UL
NRBC BLD AUTO-RTO: 0 /100
PLATELET # BLD AUTO: 338 10E3/UL (ref 150–450)
POTASSIUM SERPL-SCNC: 4.2 MMOL/L (ref 3.4–5.3)
PROT SERPL-MCNC: 8.4 G/DL (ref 6.4–8.3)
RBC # BLD AUTO: 6.23 10E6/UL (ref 4.4–5.9)
SODIUM SERPL-SCNC: 137 MMOL/L (ref 135–145)
WBC # BLD AUTO: 13.9 10E3/UL (ref 4–11)

## 2024-09-30 PROCEDURE — 250N000011 HC RX IP 250 OP 636: Performed by: STUDENT IN AN ORGANIZED HEALTH CARE EDUCATION/TRAINING PROGRAM

## 2024-09-30 PROCEDURE — 36415 COLL VENOUS BLD VENIPUNCTURE: CPT | Performed by: EMERGENCY MEDICINE

## 2024-09-30 PROCEDURE — 74177 CT ABD & PELVIS W/CONTRAST: CPT

## 2024-09-30 PROCEDURE — 82947 ASSAY GLUCOSE BLOOD QUANT: CPT | Performed by: EMERGENCY MEDICINE

## 2024-09-30 PROCEDURE — 96375 TX/PRO/DX INJ NEW DRUG ADDON: CPT

## 2024-09-30 PROCEDURE — 96374 THER/PROPH/DIAG INJ IV PUSH: CPT | Mod: 59

## 2024-09-30 PROCEDURE — 80053 COMPREHEN METABOLIC PANEL: CPT | Performed by: EMERGENCY MEDICINE

## 2024-09-30 PROCEDURE — 250N000011 HC RX IP 250 OP 636: Performed by: EMERGENCY MEDICINE

## 2024-09-30 PROCEDURE — 83605 ASSAY OF LACTIC ACID: CPT | Performed by: EMERGENCY MEDICINE

## 2024-09-30 PROCEDURE — 99285 EMERGENCY DEPT VISIT HI MDM: CPT | Mod: 25

## 2024-09-30 PROCEDURE — 120N000001 HC R&B MED SURG/OB

## 2024-09-30 PROCEDURE — 85025 COMPLETE CBC W/AUTO DIFF WBC: CPT | Performed by: EMERGENCY MEDICINE

## 2024-09-30 PROCEDURE — 258N000003 HC RX IP 258 OP 636: Performed by: EMERGENCY MEDICINE

## 2024-09-30 PROCEDURE — 250N000013 HC RX MED GY IP 250 OP 250 PS 637: Performed by: EMERGENCY MEDICINE

## 2024-09-30 PROCEDURE — 96361 HYDRATE IV INFUSION ADD-ON: CPT

## 2024-09-30 PROCEDURE — 83690 ASSAY OF LIPASE: CPT | Performed by: EMERGENCY MEDICINE

## 2024-09-30 RX ORDER — CEFAZOLIN SODIUM/WATER 3 G/30 ML
3 SYRINGE (ML) INTRAVENOUS
Status: DISCONTINUED | OUTPATIENT
Start: 2024-09-30 | End: 2024-10-02 | Stop reason: HOSPADM

## 2024-09-30 RX ORDER — IBUPROFEN 600 MG/1
600 TABLET, FILM COATED ORAL ONCE
Status: COMPLETED | OUTPATIENT
Start: 2024-09-30 | End: 2024-09-30

## 2024-09-30 RX ORDER — HYDROMORPHONE HYDROCHLORIDE 1 MG/ML
0.5 INJECTION, SOLUTION INTRAMUSCULAR; INTRAVENOUS; SUBCUTANEOUS ONCE
Status: COMPLETED | OUTPATIENT
Start: 2024-09-30 | End: 2024-09-30

## 2024-09-30 RX ORDER — CEFAZOLIN SODIUM/WATER 3 G/30 ML
3 SYRINGE (ML) INTRAVENOUS SEE ADMIN INSTRUCTIONS
Status: DISCONTINUED | OUTPATIENT
Start: 2024-09-30 | End: 2024-10-02 | Stop reason: HOSPADM

## 2024-09-30 RX ORDER — ONDANSETRON 4 MG/1
4 TABLET, ORALLY DISINTEGRATING ORAL ONCE
Status: COMPLETED | OUTPATIENT
Start: 2024-09-30 | End: 2024-09-30

## 2024-09-30 RX ORDER — IOPAMIDOL 755 MG/ML
500 INJECTION, SOLUTION INTRAVASCULAR ONCE
Status: COMPLETED | OUTPATIENT
Start: 2024-09-30 | End: 2024-09-30

## 2024-09-30 RX ORDER — ONDANSETRON 2 MG/ML
4 INJECTION INTRAMUSCULAR; INTRAVENOUS ONCE
Status: COMPLETED | OUTPATIENT
Start: 2024-09-30 | End: 2024-09-30

## 2024-09-30 RX ADMIN — IOPAMIDOL 100 ML: 755 INJECTION, SOLUTION INTRAVENOUS at 21:24

## 2024-09-30 RX ADMIN — ONDANSETRON 4 MG: 2 INJECTION INTRAMUSCULAR; INTRAVENOUS at 20:58

## 2024-09-30 RX ADMIN — ONDANSETRON 4 MG: 4 TABLET, ORALLY DISINTEGRATING ORAL at 19:14

## 2024-09-30 RX ADMIN — IBUPROFEN 600 MG: 600 TABLET, FILM COATED ORAL at 19:14

## 2024-09-30 RX ADMIN — HYDROMORPHONE HYDROCHLORIDE 0.5 MG: 1 INJECTION, SOLUTION INTRAMUSCULAR; INTRAVENOUS; SUBCUTANEOUS at 23:45

## 2024-09-30 RX ADMIN — SODIUM CHLORIDE 1000 ML: 9 INJECTION, SOLUTION INTRAVENOUS at 20:59

## 2024-09-30 RX ADMIN — HYDROMORPHONE HYDROCHLORIDE 1 MG: 1 INJECTION, SOLUTION INTRAMUSCULAR; INTRAVENOUS; SUBCUTANEOUS at 20:58

## 2024-09-30 ASSESSMENT — COLUMBIA-SUICIDE SEVERITY RATING SCALE - C-SSRS
1. IN THE PAST MONTH, HAVE YOU WISHED YOU WERE DEAD OR WISHED YOU COULD GO TO SLEEP AND NOT WAKE UP?: NO
6. HAVE YOU EVER DONE ANYTHING, STARTED TO DO ANYTHING, OR PREPARED TO DO ANYTHING TO END YOUR LIFE?: NO
2. HAVE YOU ACTUALLY HAD ANY THOUGHTS OF KILLING YOURSELF IN THE PAST MONTH?: NO

## 2024-09-30 ASSESSMENT — ACTIVITIES OF DAILY LIVING (ADL)
ADLS_ACUITY_SCORE: 37

## 2024-10-01 ENCOUNTER — ANESTHESIA (OUTPATIENT)
Dept: SURGERY | Facility: CLINIC | Age: 44
End: 2024-10-01
Payer: COMMERCIAL

## 2024-10-01 LAB
ANION GAP SERPL CALCULATED.3IONS-SCNC: 15 MMOL/L (ref 7–15)
BUN SERPL-MCNC: 18 MG/DL (ref 6–20)
CALCIUM SERPL-MCNC: 8.6 MG/DL (ref 8.8–10.4)
CHLORIDE SERPL-SCNC: 100 MMOL/L (ref 98–107)
CREAT SERPL-MCNC: 1.26 MG/DL (ref 0.67–1.17)
EGFRCR SERPLBLD CKD-EPI 2021: 73 ML/MIN/1.73M2
GLUCOSE BLDC GLUCOMTR-MCNC: 120 MG/DL (ref 70–99)
GLUCOSE BLDC GLUCOMTR-MCNC: 135 MG/DL (ref 70–99)
GLUCOSE BLDC GLUCOMTR-MCNC: 140 MG/DL (ref 70–99)
GLUCOSE BLDC GLUCOMTR-MCNC: 97 MG/DL (ref 70–99)
GLUCOSE SERPL-MCNC: 133 MG/DL (ref 70–99)
HCO3 SERPL-SCNC: 21 MMOL/L (ref 22–29)
POTASSIUM SERPL-SCNC: 4.7 MMOL/L (ref 3.4–5.3)
SODIUM SERPL-SCNC: 136 MMOL/L (ref 135–145)

## 2024-10-01 PROCEDURE — 250N000013 HC RX MED GY IP 250 OP 250 PS 637: Performed by: INTERNAL MEDICINE

## 2024-10-01 PROCEDURE — 99223 1ST HOSP IP/OBS HIGH 75: CPT | Mod: 57 | Performed by: STUDENT IN AN ORGANIZED HEALTH CARE EDUCATION/TRAINING PROGRAM

## 2024-10-01 PROCEDURE — 258N000003 HC RX IP 258 OP 636: Performed by: ANESTHESIOLOGY

## 2024-10-01 PROCEDURE — 999N000141 HC STATISTIC PRE-PROCEDURE NURSING ASSESSMENT: Performed by: STUDENT IN AN ORGANIZED HEALTH CARE EDUCATION/TRAINING PROGRAM

## 2024-10-01 PROCEDURE — 360N000080 HC SURGERY LEVEL 7, PER MIN: Performed by: STUDENT IN AN ORGANIZED HEALTH CARE EDUCATION/TRAINING PROGRAM

## 2024-10-01 PROCEDURE — 250N000009 HC RX 250: Performed by: STUDENT IN AN ORGANIZED HEALTH CARE EDUCATION/TRAINING PROGRAM

## 2024-10-01 PROCEDURE — 250N000011 HC RX IP 250 OP 636: Performed by: ANESTHESIOLOGY

## 2024-10-01 PROCEDURE — 8E0W4CZ ROBOTIC ASSISTED PROCEDURE OF TRUNK REGION, PERCUTANEOUS ENDOSCOPIC APPROACH: ICD-10-PCS | Performed by: STUDENT IN AN ORGANIZED HEALTH CARE EDUCATION/TRAINING PROGRAM

## 2024-10-01 PROCEDURE — 36415 COLL VENOUS BLD VENIPUNCTURE: CPT | Performed by: INTERNAL MEDICINE

## 2024-10-01 PROCEDURE — 250N000013 HC RX MED GY IP 250 OP 250 PS 637: Performed by: STUDENT IN AN ORGANIZED HEALTH CARE EDUCATION/TRAINING PROGRAM

## 2024-10-01 PROCEDURE — 272N000001 HC OR GENERAL SUPPLY STERILE: Performed by: STUDENT IN AN ORGANIZED HEALTH CARE EDUCATION/TRAINING PROGRAM

## 2024-10-01 PROCEDURE — 80048 BASIC METABOLIC PNL TOTAL CA: CPT | Performed by: INTERNAL MEDICINE

## 2024-10-01 PROCEDURE — 250N000009 HC RX 250: Performed by: ANESTHESIOLOGY

## 2024-10-01 PROCEDURE — 370N000017 HC ANESTHESIA TECHNICAL FEE, PER MIN: Performed by: STUDENT IN AN ORGANIZED HEALTH CARE EDUCATION/TRAINING PROGRAM

## 2024-10-01 PROCEDURE — 99254 IP/OBS CNSLTJ NEW/EST MOD 60: CPT | Performed by: INTERNAL MEDICINE

## 2024-10-01 PROCEDURE — 49594 RPR AA HRN 1ST 3-10 NCR/STRN: CPT | Mod: AS | Performed by: PHYSICIAN ASSISTANT

## 2024-10-01 PROCEDURE — 49594 RPR AA HRN 1ST 3-10 NCR/STRN: CPT | Performed by: STUDENT IN AN ORGANIZED HEALTH CARE EDUCATION/TRAINING PROGRAM

## 2024-10-01 PROCEDURE — 250N000025 HC SEVOFLURANE, PER MIN: Performed by: STUDENT IN AN ORGANIZED HEALTH CARE EDUCATION/TRAINING PROGRAM

## 2024-10-01 PROCEDURE — 0WQF4ZZ REPAIR ABDOMINAL WALL, PERCUTANEOUS ENDOSCOPIC APPROACH: ICD-10-PCS | Performed by: STUDENT IN AN ORGANIZED HEALTH CARE EDUCATION/TRAINING PROGRAM

## 2024-10-01 PROCEDURE — 258N000003 HC RX IP 258 OP 636: Performed by: STUDENT IN AN ORGANIZED HEALTH CARE EDUCATION/TRAINING PROGRAM

## 2024-10-01 PROCEDURE — 120N000001 HC R&B MED SURG/OB

## 2024-10-01 PROCEDURE — 710N000009 HC RECOVERY PHASE 1, LEVEL 1, PER MIN: Performed by: STUDENT IN AN ORGANIZED HEALTH CARE EDUCATION/TRAINING PROGRAM

## 2024-10-01 PROCEDURE — 250N000011 HC RX IP 250 OP 636: Performed by: STUDENT IN AN ORGANIZED HEALTH CARE EDUCATION/TRAINING PROGRAM

## 2024-10-01 RX ORDER — HYDROMORPHONE HCL IN WATER/PF 6 MG/30 ML
0.2 PATIENT CONTROLLED ANALGESIA SYRINGE INTRAVENOUS EVERY 5 MIN PRN
Status: DISCONTINUED | OUTPATIENT
Start: 2024-10-01 | End: 2024-10-01 | Stop reason: HOSPADM

## 2024-10-01 RX ORDER — HYDROMORPHONE HCL IN WATER/PF 6 MG/30 ML
0.4 PATIENT CONTROLLED ANALGESIA SYRINGE INTRAVENOUS
Status: DISCONTINUED | OUTPATIENT
Start: 2024-10-01 | End: 2024-10-02 | Stop reason: HOSPADM

## 2024-10-01 RX ORDER — ACETAMINOPHEN 325 MG/1
650 TABLET ORAL EVERY 4 HOURS PRN
Status: DISCONTINUED | OUTPATIENT
Start: 2024-10-04 | End: 2024-10-02 | Stop reason: HOSPADM

## 2024-10-01 RX ORDER — ONDANSETRON 4 MG/1
4 TABLET, ORALLY DISINTEGRATING ORAL EVERY 6 HOURS PRN
Status: DISCONTINUED | OUTPATIENT
Start: 2024-10-01 | End: 2024-10-02 | Stop reason: HOSPADM

## 2024-10-01 RX ORDER — SODIUM CHLORIDE, SODIUM LACTATE, POTASSIUM CHLORIDE, CALCIUM CHLORIDE 600; 310; 30; 20 MG/100ML; MG/100ML; MG/100ML; MG/100ML
INJECTION, SOLUTION INTRAVENOUS CONTINUOUS
Status: DISCONTINUED | OUTPATIENT
Start: 2024-10-01 | End: 2024-10-02 | Stop reason: HOSPADM

## 2024-10-01 RX ORDER — OXYCODONE HYDROCHLORIDE 10 MG/1
10 TABLET ORAL EVERY 4 HOURS PRN
Status: DISCONTINUED | OUTPATIENT
Start: 2024-10-01 | End: 2024-10-02 | Stop reason: HOSPADM

## 2024-10-01 RX ORDER — ONDANSETRON 2 MG/ML
4 INJECTION INTRAMUSCULAR; INTRAVENOUS EVERY 6 HOURS PRN
Status: DISCONTINUED | OUTPATIENT
Start: 2024-10-01 | End: 2024-10-02 | Stop reason: HOSPADM

## 2024-10-01 RX ORDER — LIDOCAINE HYDROCHLORIDE 20 MG/ML
INJECTION, SOLUTION INFILTRATION; PERINEURAL PRN
Status: DISCONTINUED | OUTPATIENT
Start: 2024-10-01 | End: 2024-10-01

## 2024-10-01 RX ORDER — HYDROMORPHONE HCL IN WATER/PF 6 MG/30 ML
0.4 PATIENT CONTROLLED ANALGESIA SYRINGE INTRAVENOUS EVERY 5 MIN PRN
Status: DISCONTINUED | OUTPATIENT
Start: 2024-10-01 | End: 2024-10-01 | Stop reason: HOSPADM

## 2024-10-01 RX ORDER — ONDANSETRON 4 MG/1
4 TABLET, ORALLY DISINTEGRATING ORAL EVERY 6 HOURS PRN
Qty: 20 TABLET | Refills: 0 | Status: SHIPPED | OUTPATIENT
Start: 2024-10-01

## 2024-10-01 RX ORDER — ENOXAPARIN SODIUM 100 MG/ML
40 INJECTION SUBCUTANEOUS EVERY 24 HOURS
Status: DISCONTINUED | OUTPATIENT
Start: 2024-10-02 | End: 2024-10-02 | Stop reason: HOSPADM

## 2024-10-01 RX ORDER — POLYETHYLENE GLYCOL 3350 17 G/17G
17 POWDER, FOR SOLUTION ORAL DAILY
Status: DISCONTINUED | OUTPATIENT
Start: 2024-10-02 | End: 2024-10-02 | Stop reason: HOSPADM

## 2024-10-01 RX ORDER — FENTANYL CITRATE 50 UG/ML
INJECTION, SOLUTION INTRAMUSCULAR; INTRAVENOUS PRN
Status: DISCONTINUED | OUTPATIENT
Start: 2024-10-01 | End: 2024-10-01

## 2024-10-01 RX ORDER — IBUPROFEN 600 MG/1
600 TABLET, FILM COATED ORAL EVERY 6 HOURS PRN
Status: DISCONTINUED | OUTPATIENT
Start: 2024-10-01 | End: 2024-10-02 | Stop reason: HOSPADM

## 2024-10-01 RX ORDER — LIDOCAINE 40 MG/G
CREAM TOPICAL
Status: DISCONTINUED | OUTPATIENT
Start: 2024-10-01 | End: 2024-10-02 | Stop reason: HOSPADM

## 2024-10-01 RX ORDER — NALOXONE HYDROCHLORIDE 0.4 MG/ML
0.4 INJECTION, SOLUTION INTRAMUSCULAR; INTRAVENOUS; SUBCUTANEOUS
Status: DISCONTINUED | OUTPATIENT
Start: 2024-10-01 | End: 2024-10-02 | Stop reason: HOSPADM

## 2024-10-01 RX ORDER — DEXAMETHASONE SODIUM PHOSPHATE 4 MG/ML
4 INJECTION, SOLUTION INTRA-ARTICULAR; INTRALESIONAL; INTRAMUSCULAR; INTRAVENOUS; SOFT TISSUE
Status: DISCONTINUED | OUTPATIENT
Start: 2024-10-01 | End: 2024-10-01 | Stop reason: HOSPADM

## 2024-10-01 RX ORDER — AMOXICILLIN 250 MG
1 CAPSULE ORAL 2 TIMES DAILY
Status: DISCONTINUED | OUTPATIENT
Start: 2024-10-01 | End: 2024-10-02 | Stop reason: HOSPADM

## 2024-10-01 RX ORDER — LOSARTAN POTASSIUM 100 MG/1
100 TABLET ORAL EVERY MORNING
Status: DISCONTINUED | OUTPATIENT
Start: 2024-10-01 | End: 2024-10-02 | Stop reason: HOSPADM

## 2024-10-01 RX ORDER — HYDROMORPHONE HCL IN WATER/PF 6 MG/30 ML
0.2 PATIENT CONTROLLED ANALGESIA SYRINGE INTRAVENOUS
Status: DISCONTINUED | OUTPATIENT
Start: 2024-10-01 | End: 2024-10-02 | Stop reason: HOSPADM

## 2024-10-01 RX ORDER — BUPIVACAINE HYDROCHLORIDE AND EPINEPHRINE 5; 5 MG/ML; UG/ML
INJECTION, SOLUTION PERINEURAL PRN
Status: DISCONTINUED | OUTPATIENT
Start: 2024-10-01 | End: 2024-10-01 | Stop reason: HOSPADM

## 2024-10-01 RX ORDER — NEBIVOLOL 5 MG/1
5 TABLET ORAL EVERY MORNING
Status: DISCONTINUED | OUTPATIENT
Start: 2024-10-01 | End: 2024-10-02 | Stop reason: HOSPADM

## 2024-10-01 RX ORDER — NALOXONE HYDROCHLORIDE 0.4 MG/ML
0.2 INJECTION, SOLUTION INTRAMUSCULAR; INTRAVENOUS; SUBCUTANEOUS
Status: DISCONTINUED | OUTPATIENT
Start: 2024-10-01 | End: 2024-10-02 | Stop reason: HOSPADM

## 2024-10-01 RX ORDER — ACETAMINOPHEN 325 MG/1
975 TABLET ORAL EVERY 8 HOURS
Status: DISCONTINUED | OUTPATIENT
Start: 2024-10-01 | End: 2024-10-02 | Stop reason: HOSPADM

## 2024-10-01 RX ORDER — FENTANYL CITRATE 50 UG/ML
25 INJECTION, SOLUTION INTRAMUSCULAR; INTRAVENOUS EVERY 5 MIN PRN
Status: DISCONTINUED | OUTPATIENT
Start: 2024-10-01 | End: 2024-10-01 | Stop reason: HOSPADM

## 2024-10-01 RX ORDER — OXYCODONE HYDROCHLORIDE 5 MG/1
5 TABLET ORAL EVERY 4 HOURS PRN
Qty: 10 TABLET | Refills: 0 | Status: SHIPPED | OUTPATIENT
Start: 2024-10-01

## 2024-10-01 RX ORDER — NALOXONE HYDROCHLORIDE 0.4 MG/ML
0.1 INJECTION, SOLUTION INTRAMUSCULAR; INTRAVENOUS; SUBCUTANEOUS
Status: DISCONTINUED | OUTPATIENT
Start: 2024-10-01 | End: 2024-10-01 | Stop reason: HOSPADM

## 2024-10-01 RX ORDER — PROPOFOL 10 MG/ML
INJECTION, EMULSION INTRAVENOUS PRN
Status: DISCONTINUED | OUTPATIENT
Start: 2024-10-01 | End: 2024-10-01

## 2024-10-01 RX ORDER — DEXAMETHASONE SODIUM PHOSPHATE 4 MG/ML
INJECTION, SOLUTION INTRA-ARTICULAR; INTRALESIONAL; INTRAMUSCULAR; INTRAVENOUS; SOFT TISSUE PRN
Status: DISCONTINUED | OUTPATIENT
Start: 2024-10-01 | End: 2024-10-01

## 2024-10-01 RX ORDER — FENTANYL CITRATE 50 UG/ML
50 INJECTION, SOLUTION INTRAMUSCULAR; INTRAVENOUS EVERY 5 MIN PRN
Status: DISCONTINUED | OUTPATIENT
Start: 2024-10-01 | End: 2024-10-01 | Stop reason: HOSPADM

## 2024-10-01 RX ORDER — ONDANSETRON 2 MG/ML
INJECTION INTRAMUSCULAR; INTRAVENOUS PRN
Status: DISCONTINUED | OUTPATIENT
Start: 2024-10-01 | End: 2024-10-01

## 2024-10-01 RX ORDER — SODIUM CHLORIDE, SODIUM LACTATE, POTASSIUM CHLORIDE, CALCIUM CHLORIDE 600; 310; 30; 20 MG/100ML; MG/100ML; MG/100ML; MG/100ML
INJECTION, SOLUTION INTRAVENOUS CONTINUOUS PRN
Status: DISCONTINUED | OUTPATIENT
Start: 2024-10-01 | End: 2024-10-01

## 2024-10-01 RX ORDER — ONDANSETRON 4 MG/1
4 TABLET, ORALLY DISINTEGRATING ORAL EVERY 30 MIN PRN
Status: DISCONTINUED | OUTPATIENT
Start: 2024-10-01 | End: 2024-10-01 | Stop reason: HOSPADM

## 2024-10-01 RX ORDER — OXYCODONE HYDROCHLORIDE 5 MG/1
5 TABLET ORAL EVERY 4 HOURS PRN
Status: DISCONTINUED | OUTPATIENT
Start: 2024-10-01 | End: 2024-10-02 | Stop reason: HOSPADM

## 2024-10-01 RX ORDER — ONDANSETRON 2 MG/ML
4 INJECTION INTRAMUSCULAR; INTRAVENOUS EVERY 30 MIN PRN
Status: DISCONTINUED | OUTPATIENT
Start: 2024-10-01 | End: 2024-10-01 | Stop reason: HOSPADM

## 2024-10-01 RX ORDER — SODIUM CHLORIDE, SODIUM LACTATE, POTASSIUM CHLORIDE, CALCIUM CHLORIDE 600; 310; 30; 20 MG/100ML; MG/100ML; MG/100ML; MG/100ML
INJECTION, SOLUTION INTRAVENOUS CONTINUOUS
Status: DISCONTINUED | OUTPATIENT
Start: 2024-10-01 | End: 2024-10-01 | Stop reason: HOSPADM

## 2024-10-01 RX ADMIN — LOSARTAN POTASSIUM 100 MG: 100 TABLET, FILM COATED ORAL at 08:37

## 2024-10-01 RX ADMIN — SODIUM CHLORIDE, POTASSIUM CHLORIDE, SODIUM LACTATE AND CALCIUM CHLORIDE: 600; 310; 30; 20 INJECTION, SOLUTION INTRAVENOUS at 05:11

## 2024-10-01 RX ADMIN — PHENYLEPHRINE HYDROCHLORIDE 100 MCG: 10 INJECTION INTRAVENOUS at 01:58

## 2024-10-01 RX ADMIN — SUGAMMADEX 200 MG: 100 INJECTION, SOLUTION INTRAVENOUS at 02:12

## 2024-10-01 RX ADMIN — PHENYLEPHRINE HYDROCHLORIDE 100 MCG: 10 INJECTION INTRAVENOUS at 01:40

## 2024-10-01 RX ADMIN — DEXAMETHASONE SODIUM PHOSPHATE 8 MG: 4 INJECTION, SOLUTION INTRA-ARTICULAR; INTRALESIONAL; INTRAMUSCULAR; INTRAVENOUS; SOFT TISSUE at 00:51

## 2024-10-01 RX ADMIN — LIDOCAINE HYDROCHLORIDE 40 MG: 20 INJECTION, SOLUTION INFILTRATION; PERINEURAL at 00:51

## 2024-10-01 RX ADMIN — SENNOSIDES AND DOCUSATE SODIUM 1 TABLET: 8.6; 5 TABLET ORAL at 19:46

## 2024-10-01 RX ADMIN — SODIUM CHLORIDE, POTASSIUM CHLORIDE, SODIUM LACTATE AND CALCIUM CHLORIDE: 600; 310; 30; 20 INJECTION, SOLUTION INTRAVENOUS at 00:41

## 2024-10-01 RX ADMIN — FENTANYL CITRATE 100 MCG: 50 INJECTION INTRAMUSCULAR; INTRAVENOUS at 00:51

## 2024-10-01 RX ADMIN — OXYCODONE HYDROCHLORIDE 5 MG: 5 TABLET ORAL at 12:33

## 2024-10-01 RX ADMIN — ACETAMINOPHEN 325MG 975 MG: 325 TABLET ORAL at 19:46

## 2024-10-01 RX ADMIN — ACETAMINOPHEN 325MG 975 MG: 325 TABLET ORAL at 11:37

## 2024-10-01 RX ADMIN — OXYCODONE HYDROCHLORIDE 5 MG: 5 TABLET ORAL at 18:58

## 2024-10-01 RX ADMIN — ACETAMINOPHEN 325MG 975 MG: 325 TABLET ORAL at 05:11

## 2024-10-01 RX ADMIN — SODIUM CHLORIDE, POTASSIUM CHLORIDE, SODIUM LACTATE AND CALCIUM CHLORIDE: 600; 310; 30; 20 INJECTION, SOLUTION INTRAVENOUS at 10:31

## 2024-10-01 RX ADMIN — ROCURONIUM BROMIDE 50 MG: 50 INJECTION, SOLUTION INTRAVENOUS at 00:57

## 2024-10-01 RX ADMIN — PROPOFOL 200 MG: 10 INJECTION, EMULSION INTRAVENOUS at 00:51

## 2024-10-01 RX ADMIN — NEBIVOLOL 5 MG: 5 TABLET ORAL at 08:37

## 2024-10-01 RX ADMIN — ONDANSETRON 4 MG: 2 INJECTION INTRAMUSCULAR; INTRAVENOUS at 02:08

## 2024-10-01 RX ADMIN — Medication 100 MG: at 00:51

## 2024-10-01 RX ADMIN — HYDROMORPHONE HYDROCHLORIDE 1 MG: 1 INJECTION, SOLUTION INTRAMUSCULAR; INTRAVENOUS; SUBCUTANEOUS at 01:13

## 2024-10-01 RX ADMIN — ROCURONIUM BROMIDE 20 MG: 50 INJECTION, SOLUTION INTRAVENOUS at 01:24

## 2024-10-01 RX ADMIN — SENNOSIDES AND DOCUSATE SODIUM 1 TABLET: 8.6; 5 TABLET ORAL at 08:37

## 2024-10-01 RX ADMIN — Medication 3 G: at 00:42

## 2024-10-01 RX ADMIN — SODIUM CHLORIDE, POTASSIUM CHLORIDE, SODIUM LACTATE AND CALCIUM CHLORIDE: 600; 310; 30; 20 INJECTION, SOLUTION INTRAVENOUS at 02:01

## 2024-10-01 ASSESSMENT — ACTIVITIES OF DAILY LIVING (ADL)
ADLS_ACUITY_SCORE: 22
ADLS_ACUITY_SCORE: 37
ADLS_ACUITY_SCORE: 37
ADLS_ACUITY_SCORE: 22
ADLS_ACUITY_SCORE: 37
ADLS_ACUITY_SCORE: 23
ADLS_ACUITY_SCORE: 22
ADLS_ACUITY_SCORE: 37
ADLS_ACUITY_SCORE: 22
ADLS_ACUITY_SCORE: 22

## 2024-10-01 NOTE — DISCHARGE INSTRUCTIONS
"HOME CARE FOLLOWING UMBILICAL/VENTRAL HERNIA REPAIR  SARA Brown, MELLO Najera, PRIMO Gavin, EMILIANA Cadet    DIET:  Start with liquids and gradually resume your regular diet as tolerated.  Increased fluid intake is recommended. While taking pain medications, consider use of a stool softener, increase your fiber in your diet, or add a fiber supplement (like Metamucil, Citrucel) to help prevent constipation - a possible side effect of pain medications.    NAUSEA:  If nauseated from the anesthetic/pain meds; rest in bed, get up cautiously with assistance, and drink clear liquids (juice, tea, broth).    ACTIVITY:  Light Activity -- you may immediately be up and about as tolerated.  Walking is encouraged, increase as tolerated.  Driving/Light Work-- when comfortable and off narcotic pain medications.  Strenuous Work/Activity -- limit lifting to 20 pounds for 3 weeks.  Active Sports (running, biking, etc.) -- cautiously resume after 4 weeks.    INCISIONAL CARE:  If you have a dressing in place, keep clean and dry for 48 hours after surgery.  After this timeframe, you may replace the gauze daily if it becomes soiled.  You may remove the dressing and shower 48 hours after surgery.  Do not submerse incision in water for 1 week.  If you have a Dermabond dressing (a type of skin glue), you may shower immediately.  Sutures will absorb and need not be removed.  If present, leave the steri-strips (white paper tapes) in place for 14 days after surgery.  If present, leave Dermabond glue in place until it wears/flakes off.  Do not apply lotions, creams, or ointments to incisions.  Expect a variable amount of swelling/bruising/discoloration that may appear around or below the repair site.  Some numbness around the incision is common.  A lump/\"healing ridge\" under the incision is normal and will gradually resolve over the following 1-2 months.    DISCOMFORT:  Local anesthetic placed at surgery should provide " relief for 4-8 hours.  Begin taking pain pills before discomfort is severe.  Take the pain medication with some food, when possible, to minimize side effects.  Intermittent use of ice packs to the hernia repair site may help during the first 1-3 weeks after surgery.  Expect gradual improvement.    Over-the-counter anti-inflammatory medications (i.e. Ibuprofen/Advil/Motrin or Naprosyn/Aleve) may be used per package instructions in addition to or while tapering off the narcotic pain medications to decrease swelling and sensitivity at the repair site.  DO NOT TAKE these Anti-inflammatory medications if your primary physician has advised against doing so, or if you have acid reflux, ulcer, or bleeding disorder, or take blood-thinner medications.  Call your primary physician or the surgery office if you have medication questions.      FOLLOW-UP AFTER SURGERY:  -You should see Dr Toussaint to discuss further management of your hernia. Call our office to make this appointment at your convenience.   -Office phone is [phone].  We are located at: [address], [address]; [place] [address]    -CONTACT US IF THE FOLLOWING DEVELOPS:   1. A fever that is above 101     2. Increased redness, warmth, drainage, bleeding, or swelling.   3. Pain that is not relieved by rest/ice and your prescription.   4.  Increasing pain after 48 hours.   5. Drainage that is thick, cloudy, yellow, green or white.   6. Any other questions or concerns.      FREQUENTLY ASKED QUESTIONS:    Q:  How should my incision look?    A:  Normally your incision will appear slightly swollen with light redness directly along the incision itself as it heals.  It may feel like a bump or ridge as the healing/scarring happens, and over time (3-4 months) this bump or ridge feeling should slowly go away.  In general, clear or pink watery drainage can be normal at first as your incision heals, but should decrease over time.    Q:  How do I know if my incision  is infected?  A:  Look at your incision for signs of infection, like redness around the incision spreading to surrounding skin, or drainage of cloudy or foul-smelling drainage.  If you feel warm, check your temperature to see if you are running a fever.    **If any of these things occur, please notify the nurse at our office.  We may need you to come into the office for an incision check.      Q:  How do I take care of my incision?  A:  If you have a dressing in place - Starting the day after surgery, replace the dressing 1-2 times a day until there is no further drainage from the incision.  At that time, a dressing is no longer needed.  Try to minimize tape on the skin if irritation is occurring at the tape sites.  If you have significant irritation from tape on the skin, please call the office to discuss other method of dressing your incision.    Small pieces of tape called  steri-strips  may be present directly overlying your incision; these may be removed 10 days after surgery unless otherwise specified by your surgeon.  If these tapes start to loosen at the ends, you may trim them back until they fall off or are removed.    A:  If you had  Dermabond  tissue glue used as a dressing (this causes your incision to look shiny with a clear covering over it) - This type of dressing wears off with time and does not require more dressings over the top unless it is draining around the glue as it wears off.  Do not apply ointments or lotions over the incisions until the glue has completely worn off.    Q:  There is a piece of tape or a sticky  lead  still on my skin.  Can I remove this?  A:  Sometimes the sticky  leads  used for monitoring during surgery or for evaluation in the emergency department are not all removed while you are in the hospital.  These sometimes have a tab or metal dot on them.  You can easily remove these on your own, like taking off a band-aid.  If there is a gel substance under the  lead , simply  wipe/clean it off with a washcloth or paper towel.      Q:  What can I do to minimize constipation (very hard stools, or lack of stools)?  A:  Stay well hydrated.  Increase your dietary fiber intake or take a fiber supplement -with plenty of water.  Walk around frequently.  You may consider an over-the-counter stool-softener.  Your Pharmacist can assist you with choosing one that is stocked at your pharmacy.  Constipation is also one of the most common side effects of pain medication.  If you are using pain medication, be pro-active and try to PREVENT problems with constipation by taking the steps above BEFORE constipation becomes a problem.    Q:  What do I do if I need more pain medications?  A:  Call the office to receive refills.  Be aware that certain pain meds cannot be called into a pharmacy and actually require a paper prescription.  A change may be made in your pain med as you progress thru your recovery period or if you have side effects to certain meds.    --Pain meds are NOT refilled after 5pm on weekdays, and NOT AT ALL on the weekends, so please look ahead to prevent problems.    Q:  Why am I having a hard time sleeping now that I am at home?  A:  Many medications you receive while you are in the hospital can impact your sleep for a number of days after your surgery/hospitalization.  Decreased level of activity and naps during the day may also make sleeping at night difficult.  Try to minimize day-time naps, and get up frequently during the day to walk around your home during your recovery time.  Sleep aides may be of some help, but are not recommended for long-term use.      Q:  I am having some back discomfort.  What should I do?  A:  This may be related to certain positioning that was required for your surgery, extended periods of time in bed, or other changes in your overall activity level.  You may try ice, heat, acetaminophen, or ibuprofen to treat this temporarily.  Note that many pain  medications have acetaminophen in them and would state this on the prescription bottle.  Be sure not to exceed the maximum of 4000mg per day of acetaminophen.     **If the pain you are having does not resolve, is severe, or is a flare of back pain you have had on other occasions prior to surgery, please contact your primary physician for further recommendations or for an appointment to be examined at their office.    Q:  Why am I having headaches?  A:  Headaches can be caused by many things:  caffeine withdrawal, use of pain meds, dehydration, high blood pressure, lack of sleep, over-activity/exhaustion, flare-up of usual migraine headaches.  If you feel this is related to muscle tension (a band-like feeling around the head, or a pressure at the low-back of the head) you may try ice or heat to this area.  You may need to drink more fluids (try electrolyte drink like Gatorade), rest, or take your usual migraine medications.   **If your headaches do not resolve, worsen, are accompanied by other symptoms, or if your blood pressure is high, please call your primary physician for recommendation and/or examination.    Q:  I am unable to urinate.  What do I do?  A:  A small percentage of people can have difficulty urinating initially after surgery.  This includes being able to urinate only a very small amount at a time and feeling discomfort or pressure in the very low abdomen.  This is called  urinary retention , and is actually an urgent situation.  Proceed to your nearest Emergency department for evaluation (not an Urgent Care Center).  Sometimes the bladder does not work correctly after certain medications you receive during surgery, or related to certain procedures.  You may need to have a catheter placed until your bladder recovers.  When planning to go to an Emergency department, it may help to call the ER to let them know you are coming in for this problem after a surgery.  This may help you get in quicker to be  evaluated.  **If you have symptoms of a urinary tract infection, please contact your primary physician for the proper evaluation and treatment.        If you have other questions, please call the office Monday thru Friday between 8am and 4:30pm to discuss with the nurse or physician assistant.  #[phone]    There is a surgeon ON CALL on weekday evenings and over the weekend in case of urgent need only, and may be contacted at the same number.    If you are having an emergency, call 911 or proceed to your nearest emergency department.

## 2024-10-01 NOTE — ANESTHESIA POSTPROCEDURE EVALUATION
Patient: Junito Coelho    Procedure: Procedure(s):  ROBOTIC ASSISTED PRIMARY VENTRAL HERNIA REPAIR       Anesthesia Type:  General    Note:  Disposition: Admission   Postop Pain Control: Uneventful            Sign Out: Well controlled pain   PONV: No   Neuro/Psych: Uneventful            Sign Out: Acceptable/Baseline neuro status   Airway/Respiratory: Uneventful            Sign Out: Acceptable/Baseline resp. status   CV/Hemodynamics: Uneventful            Sign Out: Acceptable CV status; No obvious hypovolemia; No obvious fluid overload   Other NRE: NONE   DID A NON-ROUTINE EVENT OCCUR? No           Last vitals:  Vitals Value Taken Time   /87 10/01/24 0235   Temp     Pulse 94 10/01/24 0240   Resp 15 10/01/24 0240   SpO2 94 % 10/01/24 0240   Vitals shown include unfiled device data.    Electronically Signed By: Antony Estevez MD  October 1, 2024  2:41 AM

## 2024-10-01 NOTE — PLAN OF CARE
Goal Outcome Evaluation:      Plan of Care Reviewed With: patient    Overall Patient Progress: improvingOverall Patient Progress: improving    Outcome Evaluation: Elevated BP noted, no change. However there was a decrease compare to last reading. Blanchable redness noted on face, pt's baseline. Pt denied n/v, denied pain. Bowel sounds present. Lungs sounds clear. Dressings on incision sites clean, dry and intact. Pt walked around the unit with stand by assist with a gait belt. Per pt last bm was 9/30/24 morning. Pt voids fine. Possible discharge home tomorrow.

## 2024-10-01 NOTE — OP NOTE
[facility] General Surgery Operative Note    Pre-operative diagnosis: Incarcerated ventral hernia with small bowel obstruction  Morbid obesity     Post-operative diagnosis: Same    Procedure: 1.) Robotic assisted lysis of adhesions   2.) Robotic assisted primary ventral hernia repair - total length of defect is 8 cm    Surgeon: Junito Toussaint MD    Assistant(s): Enid Littlejohn PA-C  The Physician Assistant was medically necessary for their expertise in prepping, camera management, exchanging robotic instruments, passing suture and mesh through the robotic ports, and suturing   Anesthesia: general   Estimated blood loss:  Specimen:   5 cc  none       INDICATION: 45 yo male who presented to the ED with an incarcerated ventral hernia with bowel obstruction. This was not able to be reduced at bedside. I recommended urgent operative intervention to relieve the bowel obstruction.     DESCRIPTION OF PROCEDURE:   The patient was brought to the operating room, placed on the operating room table in supine position. Anesthesia was induced. His pressure points were padded and he was secured with a safety strap. A timeout was called to verify the patient, site of procedure and procedure to be performed.    The abdomen was entered with a optiview trocar in the left upper quadrant under direct visualization. A pneumoperitoneum was established. Three 8 mm robotic ports were placed on the right lateral abdomen under direct vision and the robot was docked. The hernia was manipulated with gentle external pressure and the bowel appeared to reduce out of the defect. The rest of the abdomen was surveyed and there was a fair amount of dense fatty adhesions to the anterior abdominal wall. These adhesions were taken down with blunt and sharp dissection to clear off the anterior abdominal wall. We then viewed the hernia and there were actually multiple defects causing a swiss cheese defect of the abdominal wall. At least 3 hernias  were seen. The largest measuring about 5 cm in size contained the bowel which had been reduced. Additional omental adhesions in the hernias were reduced. The bowel was examined and appeared viable. An additional 1-2 cm hernia was located about 1 cm superior to the large defect and an additional 2 cm hernia was located inferior to the large defect at the level of the umbilicus. Given his co-morbidities and the complex abdominal wall with a swiss cheese defect I did not feel attempting a formal repair in this less than ideal setting would be advised. Therefore, I primarily closed the hernias with running 0-Stratafix suture. No mesh was placed. I will advise smoking cessation and weight loss prior to undergoing formal hernia repair with mesh. The cavity was inspected and hemostasis was adequate. The robot was undocked. The trocars were then removed and pneumoperitoneum evacuated. The skin was closed with 4-0 Vicryl suture. Sterile dressings were applied. At the end of the operation, all sponge, instrument, and needle counts were correct.     FINDINGS: 3 separate hernia defects along the midline of the anterior abdominal wall causing a swiss cheese defect. The largest measured about 5 cm. The obstructed bowel was reduced and was viable. The hernias were repaired primarily and no mesh was used as detailed in the body of the operative report.     Junito Toussaint MD

## 2024-10-01 NOTE — ED NOTES
"[facility]  ED Nurse Handoff Report    ED Chief complaint: Abdominal Pain  . ED Diagnosis:   Final diagnoses:   Incarcerated hernia   Small bowel obstruction (H)       Allergies: No Known Allergies    Code Status: Full Code    Activity level - Baseline/Home:  independent.  Activity Level - Current:   independent.   Lift room needed: No.   Bariatric: No   Needed: No   Isolation: No.   Infection: Not Applicable.     Respiratory status: Room air    Vital Signs (within 30 minutes):   Vitals:    09/30/24 1908 09/30/24 2210 09/30/24 2220 09/30/24 2230   BP: (!) 169/127 (!) 177/107 (!) 176/118 (!) 152/116   Pulse: 84 87 87 98   Resp: 18      Temp: 97  F (36.1  C)      TempSrc: Temporal      SpO2: 94% 96% 96% 96%   Weight: 136.1 kg (300 lb)      Height: 1.803 m (5' 11\")          Cardiac Rhythm:  ,      Pain level:    Patient confused: NO .   Patient Falls Risk: activity supervised.   Elimination Status: Unable to void     Patient Report - Initial Complaint: Abd pain with hernia .   Focused Assessment: Junito Coelho is a 44 year old male with a history of CKD Stage 2 and hypertension who presents with his sisters for a complaint of abdominal pain. The patient reports an onset of central abdominal pain this morning. It is constant and has not improved since the onset. He has a visible hernia on his central abdomen, and the pain is in the same area. He reports that the hernia earlier today was very hard, but is now softer. He does not have pain near or from the hernia at baseline. He is unsure if the area was red or warm earlier today. He is unsure if this feels similar to previous pancreatitis. His sisters report that he has been having significant vomiting all day, as well as episodes of diaphoresis. He had a similar episode a few weeks ago which resolved without intervention.      Abnormal Results:   Labs Ordered and Resulted from Time of ED Arrival to Time of ED Departure   COMPREHENSIVE " METABOLIC PANEL - Abnormal       Result Value    Sodium 137      Potassium 4.2      Carbon Dioxide (CO2) 21 (*)     Anion Gap 19 (*)     Urea Nitrogen 16.0      Creatinine 1.19 (*)     GFR Estimate 78      Calcium 9.8      Chloride 97 (*)     Glucose 193 (*)     Alkaline Phosphatase 75      AST 36      ALT 64      Protein Total 8.4 (*)     Albumin 4.6      Bilirubin Total 0.8     CBC WITH PLATELETS AND DIFFERENTIAL - Abnormal    WBC Count 13.9 (*)     RBC Count 6.23 (*)     Hemoglobin 19.4 (*)     Hematocrit 54.5 (*)     MCV 88      MCH 31.1      MCHC 35.6      RDW 12.3      Platelet Count 338      % Neutrophils 87      % Lymphocytes 9      % Monocytes 4      % Eosinophils 0      % Basophils 1      % Immature Granulocytes 0      NRBCs per 100 WBC 0      Absolute Neutrophils 12.0 (*)     Absolute Lymphocytes 1.2      Absolute Monocytes 0.5      Absolute Eosinophils 0.0      Absolute Basophils 0.1      Absolute Immature Granulocytes 0.1      Absolute NRBCs 0.0     LACTIC ACID WHOLE BLOOD WITH 1X REPEAT IN 2 HR WHEN >2 - Abnormal    Lactic Acid, Initial 2.5 (*)    LIPASE - Normal    Lipase 31     LACTIC ACID WHOLE BLOOD - Normal    Lactic Acid 1.8     ROUTINE UA WITH MICROSCOPIC REFLEX TO CULTURE        CT Abdomen Pelvis w Contrast   Preliminary Result   IMPRESSION:    1.  There is a moderate-sized umbilical ventral hernia containing a loop of small bowel with narrowing of the bowel loop along the right lateral margin of the hernia and proximal small bowel distention compatible with mechanical small bowel obstruction.    There is mucosal enhancement and mild stranding in the hernia sac and early incarceration cannot be excluded.      2.  Fatty liver.      3.  Splenomegaly.      4.  Cholecystectomy.      NOTE: ABNORMAL REPORT      1.  THE DICTATION ABOVE DESCRIBES AN ABNORMALITY FOR WHICH FOLLOW-UP IS NEEDED.           Treatments provided: pain management   Family Comments: at the bedside   OBS brochure/video  discussed/provided to patient:  N/A  ED Medications:   Medications   ibuprofen (ADVIL/MOTRIN) tablet 600 mg (600 mg Oral $Given 9/30/24 1914)   ondansetron (ZOFRAN ODT) ODT tab 4 mg (4 mg Oral $Given 9/30/24 1914)   sodium chloride 0.9% BOLUS 1,000 mL (1,000 mLs Intravenous $New Bag 9/30/24 2059)   HYDROmorphone (DILAUDID) injection 1 mg (1 mg Intravenous $Given 9/30/24 2058)   ondansetron (ZOFRAN) injection 4 mg (4 mg Intravenous $Given 9/30/24 2058)   iopamidol (ISOVUE-370) solution 500 mL (100 mLs Intravenous $Given 9/30/24 2124)   sodium chloride (PF) 0.9% PF flush 100 mL (65 mLs Intravenous $Given 9/30/24 2125)       Drips infusing:  No  For the majority of the shift this patient was Green.   Interventions performed were NA .    Sepsis treatment initiated: No    Cares/treatment/interventions/medications to be completed following ED care: NPO     ED Nurse Name: Leopoldo Richter RN  11:06 PM

## 2024-10-01 NOTE — CONSULTS
Consult     Junito Coelho MRN# [id number]   YOB: 1980 Age: 44 year old      Date of Admission:  9/30/2024    Requesting Provider:  Junito Toussaint MD   Primary care provider: Nadeem Kidd          Assessment and Plan:     Summary of Stay: Junito Coelho is a 44 year old male with a history of hypertension CKD 2 with baseline creat 1-1.3 range, obesity  admitted on 9/30/2024 with abdominal pain and found to have incarcerated ventral hernia complicated by SBO.      Presented with abrupt onset of abdominal pain.    ER VS notable for htn  BMP with AGMA CO2 21, AG 19, lactate 2.5  CBC with leukocytosis 14 with neutrophilia.  Hgb was quite elevated at 19.4    CT a/p with incarcerated ventral hernia with SBO     We are asked to assist in post op medical management     Anesthesia records reviewed LR 1.3 L in, EBL 5 ml     I see him post op and he states he is doing well.  No cp/sob/n/v, he's being allowed to take sips of water     Problem List:   Incarcerated ventral hernia complicated by SBO  S/p surgical repair   Pain control and po per surgery     AGMA   2/2 elevated lactic acid in the setting incarcerated hernia  Suspect resolved at this juncture  Aditya bmp this am     Hyperglycemia   Suspect stress response.  He's periodically had hgb A1c's cked in the past.  Most recently was 5.1 in 2/2024  He also rec'd 8 mg of dex in the OR so at risk for steroid induced hyperglycemia as well   -BG cks bid x 4 and notify MD is > 200    Htn  Pta is on losartan 100 mg and nebivolol 5 mg every day  -both resumed, monitor renal function     CKD 2  Baseline creat 1-1.3  On empagliflozin 10 mg every day for this  -will hold in the setting of recent surgery etc, can be resumed at discharge     Obesity   Complicates cares.  Denies known hx of JAYDEN but states he probably has it  -capnography post op         DVT Prophylaxis: Defer to primary service  Code Status: Full Code  Functional Status: independent  Moran: not  needed  Access:   PIV              Time spent 50 minutes reviewing epic including notes/labs/prior hx, current medications.  In addition to interviewing and examining the patient, updated patient and family regarding plan of care            History of Present Illness:   Junito Coelho is a 44 year old male with a history of hypertension CKD 2 with baseline creat 1-1.3 range, obesity  admitted on 9/30/2024 with abdominal pain and found to have incarcerated ventral hernia complicated by SBO.      Presented with abrupt onset of abdominal pain.    ER VS notable for htn  BMP with AGMA CO2 21, AG 19, lactate 2.5  CBC with leukocytosis 14 with neutrophilia.  Hgb was quite elevated at 19.4    CT a/p with incarcerated ventral hernia with SBO     We are asked to assist in post op medical management     Anesthesia records reviewed LR 1.3 L in, EBL 5 ml     I see him post op and he states he is doing well.  No cp/sob/n/v, he's being allowed to take sips of water       The history is obtained in discussion with patient     Epic and Care everywhere were extensively reviewed        Past Medical History:     Past Medical History:   Diagnosis Date    Abdominal pain     Benign essential hypertension     CKD (chronic kidney disease) stage 2, GFR 60-89 ml/min     Gastro-oesophageal reflux disease     Morbid obesity with BMI of 40.0-44.9, adult (H)     BMI 44             Past Surgical History:     Past Surgical History:   Procedure Laterality Date    LAPAROSCOPIC CHOLECYSTECTOMY N/A 6/19/2015    Procedure: LAPAROSCOPIC CHOLECYSTECTOMY;  Surgeon: John Yepez MD;  Location: [facility]    ORTHOPEDIC SURGERY      right hand             Social History:     Social History     Tobacco Use    Smoking status: Some Days     Current packs/day: 0.50     Average packs/day: 0.5 packs/day for 15.0 years (7.5 ttl pk-yrs)     Types: Cigarettes    Smokeless tobacco: Never   Substance Use Topics    Alcohol use: No     Alcohol/week: 0.0 standard  "drinks of alcohol             Family History:     Family History   Problem Relation Age of Onset    Cerebrovascular Disease Mother 62        acute stroke with residual left sided hemiparesis    Hypertension Father     Coronary Artery Disease Father 56        heart attack at 56    Family History Negative Sister     Family History Negative Sister     Family History Negative Sister     Family History Negative Sister             Allergies:   No Known Allergies          Medications:     Prior to Admission medications    Medication Sig Last Dose Taking? Auth Provider Long Term End Date   empagliflozin (JARDIANCE) 10 MG TABS tablet Take 1 tablet (10 mg) by mouth daily More than a month Yes Nadeem Kidd MD     losartan (COZAAR) 100 MG tablet Take 1 tablet (100 mg) by mouth every evening 9/30/2024 Yes Nadeem Kidd MD Yes    nebivolol (BYSTOLIC) 5 MG tablet Take 1 tablet (5 mg) by mouth daily 9/30/2024 Yes Nadeem Kidd MD Yes                Review of Systems:     A Comprehensive greater than 10 system review of systems was carried out.  Pertinent positives and negatives are noted above.  Otherwise negative for contributory information.           Physical Exam:   Blood pressure (!) 141/85, pulse 94, temperature 98.2  F (36.8  C), temperature source Temporal, resp. rate 16, height 1.803 m (5' 11\"), weight 136.1 kg (300 lb), SpO2 94%.  Exam:    General:  Pleasant nad looks stated age  HEENT:  Head nc/at sclera clear PER  Neck is supple  Lungs: cta b nl effort   CV:  RRR no m/r/g no le edema  Abd:  exam deferred in setting of recent surgery   Neuro:  Cn 2-12 grossly intact and reina  Alert and oriented affect appropriate   Skin:  W/d no c/c               Data:     Results for orders placed or performed during the hospital encounter of 09/30/24   CT Abdomen Pelvis w Contrast     Status: None    Narrative    EXAM: CT ABDOMEN PELVIS W CONTRAST  LOCATION: [facility]  DATE: " 9/30/2024    INDICATION: Abdominal pain, vomiting, periumbilical hernia, history of pancreatitis.  COMPARISON: 4/5/2021.  TECHNIQUE: CT scan of the abdomen and pelvis was performed following injection of IV contrast. Multiplanar reformats were obtained. Dose reduction techniques were used.  CONTRAST: 100 mL Isovue-370.    FINDINGS:   LOWER CHEST: Normal.    HEPATOBILIARY: Normal. Fatty liver. Cholecystectomy.     PANCREAS: Normal.    SPLEEN: Spleen is enlarged. Calcified granuloma.    ADRENAL GLANDS: Normal.    KIDNEYS/BLADDER: Normal.    BOWEL: Normal. There is a moderate-sized ventral supraumbilical hernia present beginning 2.5 cm cephalad to the umbilicus containing a loop of small bowel where there is narrowing along the right aspect of the neck and proximal small bowel distention   compatible with mechanical small bowel obstruction. There is some mucosal enhancement and mild stranding within the hernia sac and early incarceration is a possibility. Region of fatty necrosis within the omentum in the anterior midabdomen. Small   fat-containing periumbilical hernia. No appendicitis.    LYMPH NODES: Normal.    VASCULATURE: Normal.    PELVIC ORGANS: Calcification vas deferens compatible with diabetes.    MUSCULOSKELETAL: Normal.      Impression    IMPRESSION:   1.  There is a moderate-sized umbilical ventral hernia containing a loop of small bowel with narrowing of the bowel loop along the right lateral margin of the hernia and proximal small bowel distention compatible with mechanical small bowel obstruction.   There is mucosal enhancement and mild stranding in the hernia sac and early incarceration cannot be excluded.    2.  Fatty liver.    3.  Splenomegaly.    4.  Cholecystectomy.    NOTE: ABNORMAL REPORT    1.  THE DICTATION ABOVE DESCRIBES AN ABNORMALITY FOR WHICH FOLLOW-UP IS NEEDED.    Comprehensive metabolic panel     Status: Abnormal   Result Value Ref Range    Sodium 137 135 - 145 mmol/L    Potassium 4.2  3.4 - 5.3 mmol/L    Carbon Dioxide (CO2) 21 (L) 22 - 29 mmol/L    Anion Gap 19 (H) 7 - 15 mmol/L    Urea Nitrogen 16.0 6.0 - 20.0 mg/dL    Creatinine 1.19 (H) 0.67 - 1.17 mg/dL    GFR Estimate 78 >60 mL/min/1.73m2    Calcium 9.8 8.8 - 10.4 mg/dL    Chloride 97 (L) 98 - 107 mmol/L    Glucose 193 (H) 70 - 99 mg/dL    Alkaline Phosphatase 75 40 - 150 U/L    AST 36 0 - 45 U/L    ALT 64 0 - 70 U/L    Protein Total 8.4 (H) 6.4 - 8.3 g/dL    Albumin 4.6 3.5 - 5.2 g/dL    Bilirubin Total 0.8 <=1.2 mg/dL   [place] Draw     Status: None    Narrative    The following orders were created for panel order [place] Draw.  Procedure                               Abnormality         Status                     ---------                               -----------         ------                     Extra Blue Top Tube[[id number]]                              Final result               Extra Red Top Tube[[id number]]                               Final result               Extra Green Top (Lithium...[[id number]]                                                 Extra Purple Top Tube[[id number]]                                                         Please view results for these tests on the individual orders.   CBC with platelets and differential     Status: Abnormal   Result Value Ref Range    WBC Count 13.9 (H) 4.0 - 11.0 10e3/uL    RBC Count 6.23 (H) 4.40 - 5.90 10e6/uL    Hemoglobin 19.4 (H) 13.3 - 17.7 g/dL    Hematocrit 54.5 (H) 40.0 - 53.0 %    MCV 88 78 - 100 fL    MCH 31.1 26.5 - 33.0 pg    MCHC 35.6 31.5 - 36.5 g/dL    RDW 12.3 10.0 - 15.0 %    Platelet Count 338 150 - 450 10e3/uL    % Neutrophils 87 %    % Lymphocytes 9 %    % Monocytes 4 %    % Eosinophils 0 %    % Basophils 1 %    % Immature Granulocytes 0 %    NRBCs per 100 WBC 0 <1 /100    Absolute Neutrophils 12.0 (H) 1.6 - 8.3 10e3/uL    Absolute Lymphocytes 1.2 0.8 - 5.3 10e3/uL    Absolute Monocytes 0.5 0.0 - 1.3 10e3/uL    Absolute Eosinophils 0.0 0.0 - 0.7 10e3/uL     Absolute Basophils 0.1 0.0 - 0.2 10e3/uL    Absolute Immature Granulocytes 0.1 <=0.4 10e3/uL    Absolute NRBCs 0.0 10e3/uL   Extra Blue Top Tube     Status: None   Result Value Ref Range    Hold Specimen JIC    Extra Red Top Tube     Status: None   Result Value Ref Range    Hold Specimen JIC    Lipase     Status: Normal   Result Value Ref Range    Lipase 31 13 - 60 U/L   Lactic acid whole blood with 1x repeat in 2 hr when >2     Status: Abnormal   Result Value Ref Range    Lactic Acid, Initial 2.5 (H) 0.7 - 2.0 mmol/L   Lactic acid whole blood     Status: Normal   Result Value Ref Range    Lactic Acid 1.8 0.7 - 2.0 mmol/L   CBC with Platelets & Differential     Status: Abnormal    Narrative    The following orders were created for panel order CBC with Platelets & Differential.  Procedure                               Abnormality         Status                     ---------                               -----------         ------                     CBC with platelets and d...[[id number]]  Abnormal            Final result                 Please view results for these tests on the individual orders.

## 2024-10-01 NOTE — PROGRESS NOTES
[facility]  POD#0 s/p robotic assisted reduction and repair incisional hernia.  Feels well, minimal discomfort, denies nausea, tolerated clears, is feeling hungry. No flatus since yesterday.  No fevers  NAD, comfortable  Abd soft, non-distended, dressings in right lateral abdomen are dry, no palpable hernia contents at umbilicus, rare bowel tones.  Doing as anticipated  ADAT  PRN pain meds  Likely home later today  Discussed that he should follow up with Dr Toussaint to discuss more definitive hernia repair and modifiable risk factors to reduce periop complications.

## 2024-10-01 NOTE — BRIEF OP NOTE
[facility]    Brief Operative Note    Pre-operative diagnosis: Incarcerated hernia [K46.0]  Post-operative diagnosis Same as pre-operative diagnosis    Procedure: ROBOTIC ASSISTED PRIMARY VENTRAL HERNIA REPAIR, N/A - Abdomen    Surgeon: Surgeons and Role:     * Junito Toussaint MD - Primary     * Enid Littlejohn PA-C - Assisting  Anesthesia: General   Estimated Blood Loss: Minimal    Drains: None  Specimens: * No specimens in log *  Findings:   Complex swiss cheese ventral hernia - bowel reduced and was viable, large amount of omentum reduce, hernia repaired primarily - no mesh placed, see op note for details .  Complications: None.  Implants: * No implants in log *

## 2024-10-01 NOTE — PLAN OF CARE
"Goal Outcome Evaluation:      Plan of Care Reviewed With: patient    Overall Patient Progress: no changeOverall Patient Progress: no change         Admitted from PACU at 0350  Denies pain. 1 LO2 NC. Capno inplace. LR infusing.  Clear liquid diet   Surgical sites are CDI       Problem: Adult Inpatient Plan of Care  Goal: Plan of Care Review  Description: The Plan of Care Review/Shift note should be completed every shift.  The Outcome Evaluation is a brief statement about your assessment that the patient is improving, declining, or no change.  This information will be displayed automatically on your shift  note.  Outcome: Progressing  Flowsheets (Taken 10/1/2024 0458)  Plan of Care Reviewed With: patient  Overall Patient Progress: no change  Goal: Patient-Specific Goal (Individualized)  Description: You can add care plan individualizations to a care plan. Examples of Individualization might be:  \"Parent requests to be called daily at 9am for status\", \"I have a hard time hearing out of my right ear\", or \"Do not touch me to wake me up as it startles  me\".  Outcome: Progressing  Goal: Absence of Hospital-Acquired Illness or Injury  Outcome: Progressing  Intervention: Identify and Manage Fall Risk  Recent Flowsheet Documentation  Taken 10/1/2024 0408 by Yefri Sandhu RN  Safety Promotion/Fall Prevention:   activity supervised   mobility aid in reach   lighting adjusted   room door open   room near nurse's station  Intervention: Prevent Skin Injury  Recent Flowsheet Documentation  Taken 10/1/2024 0408 by Yefri Sandhu RN  Device Skin Pressure Protection:   absorbent pad utilized/changed   adhesive use limited  Intervention: Prevent and Manage VTE (Venous Thromboembolism) Risk  Recent Flowsheet Documentation  Taken 10/1/2024 0408 by Yefri Sandhu RN  VTE Prevention/Management: SCDs on (sequential compression devices)  Intervention: Prevent Infection  Recent Flowsheet Documentation  Taken 10/1/2024 0408 by Edson" eYfri, RN  Infection Prevention:   rest/sleep promoted   hand hygiene promoted  Goal: Optimal Comfort and Wellbeing  Outcome: Progressing  Goal: Readiness for Transition of Care  Outcome: Progressing  Intervention: Mutually Develop Transition Plan  Recent Flowsheet Documentation  Taken 10/1/2024 0410 by Yefri Sandhu, RN  Equipment Currently Used at Home: none

## 2024-10-01 NOTE — ANESTHESIA PROCEDURE NOTES
Airway       Patient location during procedure: OR       Procedure Start/Stop Times: 10/1/2024 12:54 AM  Staff -        Anesthesiologist:  Antony Estevez MD       CRNA: Ratna Damico APRN CRNA       Performed By: anesthesiologist  Consent for Airway        Urgency: elective  Indications and Patient Condition       Indications for airway management: brittany-procedural       Induction type:intravenous       Mask difficulty assessment: 2 - vent by mask + OA or adjuvant +/- NMBA    Final Airway Details       Final airway type: endotracheal airway       Successful airway: ETT - single  Endotracheal Airway Details        ETT size (mm): 8.0       Cuffed: yes       Cuff volume (mL): 8       Successful intubation technique: video laryngoscopy       VL Blade Size: Glidescope 4       Grade View of Cords: 1       Adjucts: stylet       Position: Right       Measured from: gums/teeth       Secured at (cm): 25       Bite block used: None    Post intubation assessment        Placement verified by: capnometry, equal breath sounds and chest rise        Number of attempts at approach: 1       Number of other approaches attempted: 0       Secured with: tape       Ease of procedure: easy       Dentition: Intact and Unchanged    Medication(s) Administered   Medication Administration Time: 10/1/2024 12:54 AM

## 2024-10-01 NOTE — ANESTHESIA CARE TRANSFER NOTE
Patient: Junito Coelho    Procedure: Procedure(s):  ROBOTIC ASSISTED PRIMARY VENTRAL HERNIA REPAIR       Diagnosis: Incarcerated hernia [K46.0]  Diagnosis Additional Information: No value filed.    Anesthesia Type:   General     Note:    Oropharynx: oropharynx clear of all foreign objects and spontaneously breathing  Level of Consciousness: drowsy  Oxygen Supplementation: face mask  Level of Supplemental Oxygen (L/min / FiO2): 8  Independent Airway: airway patency satisfactory and stable  Dentition: dentition unchanged  Vital Signs Stable: post-procedure vital signs reviewed and stable  Report to RN Given: handoff report given  Patient transferred to: PACU    Handoff Report: Identifed the Patient, Identified the Reponsible Provider, Reviewed the pertinent medical history, Discussed the surgical course, Reviewed Intra-OP anesthesia mangement and issues during anesthesia, Set expectations for post-procedure period and Allowed opportunity for questions and acknowledgement of understanding  Vitals:  Vitals Value Taken Time   /74 10/01/24 0230   Temp     Pulse 94 10/01/24 0233   Resp 14 10/01/24 0233   SpO2 94 % 10/01/24 0233   Vitals shown include unfiled device data.    Electronically Signed By: JOSE C Thompson CRNA  October 1, 2024  2:34 AM

## 2024-10-01 NOTE — H&P
[facility]    Junito Coelho MRN# [id number]   Age: 44 year old YOB: 1980     Date of Admission:  9/30/2024    Reason for consult:            Incarcerated hernia        Requesting physician:            Dr Liu                Assessment and Plan:   Assessment/Plan:   #incarcerated ventral hernia with bowel obstruction   #morbid obesity, BMI > 40    I have reviewed the CT which shows a loop of bowel in the defect with associated obstruction. He is tender on exam. I have recommended urgent operative intervention. Will plan for robotic assisted ventral hernia repair, possibly with mesh. However, may have to open and/or perform a bowel resection if the bowel is compromised.     I discussed other risks of surgery as well including bleeding, infection, injury to adjacent structures or organs, recurrence, and risks of anesthesia. He is at higher risk of complications due to obesity.              Chief Complaint:   Abdominal pain     History is obtained from the patient.         History of Present Illness:   Junito Coelho is a 44 year old male with a history of pancreatitis and obesity who presented to the ED today with acute onset abdominal pain. The pain started this morning and is located at a known hernia. He has had several episodes of nausea and vomiting. He had similar symptoms a few weeks ago that resolved without by itself. He has a surgical history of laparoscopic cholecystectomy. Reduction was attempted in the ED and he feels a little better but is still quite sore at the hernia site when palpated.           Past Medical History:     Past Medical History:   Diagnosis Date    Abdominal pain     Benign essential hypertension     Gastro-oesophageal reflux disease     Morbid obesity with BMI of 40.0-44.9, adult (H)     BMI 44             Past Surgical History:     Past Surgical History:   Procedure Laterality Date    LAPAROSCOPIC CHOLECYSTECTOMY N/A 6/19/2015    Procedure: LAPAROSCOPIC  CHOLECYSTECTOMY;  Surgeon: John Yepez MD;  Location: [facility]    ORTHOPEDIC SURGERY      right hand             Social History:     Social History     Tobacco Use    Smoking status: Some Days     Current packs/day: 0.50     Average packs/day: 0.5 packs/day for 15.0 years (7.5 ttl pk-yrs)     Types: Cigarettes    Smokeless tobacco: Never   Substance Use Topics    Alcohol use: No     Alcohol/week: 0.0 standard drinks of alcohol             Family History:     Family History   Problem Relation Age of Onset    Cerebrovascular Disease Mother 62        acute stroke with residual left sided hemiparesis    Hypertension Father     Coronary Artery Disease Father 56        heart attack at 56    Family History Negative Sister     Family History Negative Sister     Family History Negative Sister     Family History Negative Sister           Allergies:   No Known Allergies          Medications:     Current Facility-Administered Medications   Medication Dose Route Frequency Provider Last Rate Last Admin    ceFAZolin Sodium (ANCEF) injection 3 g  3 g Intravenous Pre-Op/Pre-procedure x 1 dose Junito Toussaint MD        ceFAZolin Sodium (ANCEF) injection 3 g  3 g Intravenous See Admin Instructions Junito Toussaint MD         Current Outpatient Medications   Medication Sig Dispense Refill    empagliflozin (JARDIANCE) 10 MG TABS tablet Take 1 tablet (10 mg) by mouth daily 30 tablet 11    losartan (COZAAR) 100 MG tablet Take 1 tablet (100 mg) by mouth every evening 90 tablet 3    nebivolol (BYSTOLIC) 5 MG tablet Take 1 tablet (5 mg) by mouth daily 90 tablet 3     Current Facility-Administered Medications   Medication Dose Route Frequency Provider Last Rate Last Admin    ceFAZolin Sodium (ANCEF) injection 3 g  3 g Intravenous Pre-Op/Pre-procedure x 1 dose Junito Toussaint MD        ceFAZolin Sodium (ANCEF) injection 3 g  3 g Intravenous See Admin Instructions Junito Toussaint MD                Review of Systems:   The 10  "point review of systems is negative other than noted in the HPI.          Physical Exam:   BP (!) 160/125   Pulse 98   Temp 97  F (36.1  C) (Temporal)   Resp 18   Ht 1.803 m (5' 11\")   Wt 136.1 kg (300 lb)   SpO2 96%   BMI 41.84 kg/m    General - Well developed, well nourished male in no apparent distress  HEENT:  no scleral icterus  Lungs: normal effort on RA; clear lungs bilaterally to auscultation   Heart: regular rate and rhythm, no murmurs   Abdomen: soft, obese, there is a moderate-large supraumbilical hernia that is not able to be reduced and is tender to palpation, the overlying skin is intact and not erythematous   Extremities: Warm without edema  Neurologic: nonfocal  Psychiatric: Mood and affect appropriate  Skin: Without lesions, rashes, or jaundice         Data:     WBC -   Lab Results   Component Value Date    WBC 13.9 (H) 09/30/2024       HgB -   Lab Results   Component Value Date    HGB 19.4 (H) 09/30/2024       Plt-   Lab Results   Component Value Date     09/30/2024       Liver Function Studies -   Recent Labs   Lab Test 09/30/24 1953   PROTTOTAL 8.4*   ALBUMIN 4.6   BILITOTAL 0.8   ALKPHOS 75   AST 36   ALT 64       Lipase-   Lab Results   Component Value Date    LIPASE 31 09/30/2024         Imaging:  All imaging studies reviewed by me.    Results for orders placed or performed during the hospital encounter of 09/30/24   CT Abdomen Pelvis w Contrast    Narrative    EXAM: CT ABDOMEN PELVIS W CONTRAST  LOCATION: [facility]  DATE: 9/30/2024    INDICATION: Abdominal pain, vomiting, periumbilical hernia, history of pancreatitis.  COMPARISON: 4/5/2021.  TECHNIQUE: CT scan of the abdomen and pelvis was performed following injection of IV contrast. Multiplanar reformats were obtained. Dose reduction techniques were used.  CONTRAST: 100 mL Isovue-370.    FINDINGS:   LOWER CHEST: Normal.    HEPATOBILIARY: Normal. Fatty liver. Cholecystectomy.     PANCREAS: " Normal.    SPLEEN: Spleen is enlarged. Calcified granuloma.    ADRENAL GLANDS: Normal.    KIDNEYS/BLADDER: Normal.    BOWEL: Normal. There is a moderate-sized ventral supraumbilical hernia present beginning 2.5 cm cephalad to the umbilicus containing a loop of small bowel where there is narrowing along the right aspect of the neck and proximal small bowel distention   compatible with mechanical small bowel obstruction. There is some mucosal enhancement and mild stranding within the hernia sac and early incarceration is a possibility. Region of fatty necrosis within the omentum in the anterior midabdomen. Small   fat-containing periumbilical hernia. No appendicitis.    LYMPH NODES: Normal.    VASCULATURE: Normal.    PELVIC ORGANS: Calcification vas deferens compatible with diabetes.    MUSCULOSKELETAL: Normal.      Impression    IMPRESSION:   1.  There is a moderate-sized umbilical ventral hernia containing a loop of small bowel with narrowing of the bowel loop along the right lateral margin of the hernia and proximal small bowel distention compatible with mechanical small bowel obstruction.   There is mucosal enhancement and mild stranding in the hernia sac and early incarceration cannot be excluded.    2.  Fatty liver.    3.  Splenomegaly.    4.  Cholecystectomy.    NOTE: ABNORMAL REPORT    1.  THE DICTATION ABOVE DESCRIBES AN ABNORMALITY FOR WHICH FOLLOW-UP IS NEEDED.        Junito Toussaint MD

## 2024-10-01 NOTE — PLAN OF CARE
"A&Ox4.On room air. Capno in place. BP elevated this am. Pain controlled with PO Oxy and Tylenol. Denies nausea. Dressing to abd incisions CDI. Denies passing gas. Bowel sounds audible. Voiding well. Tolerating regular diet. Blood sugars this shift were 135 and 120. Up to chair for lunch. Transfers ax1 gb. Possible discharge home this evening.      Goal Outcome Evaluation:      Plan of Care Reviewed With: patient    Overall Patient Progress: improvingOverall Patient Progress: improving       Problem: Adult Inpatient Plan of Care  Goal: Plan of Care Review  Description: The Plan of Care Review/Shift note should be completed every shift.  The Outcome Evaluation is a brief statement about your assessment that the patient is improving, declining, or no change.  This information will be displayed automatically on your shift  note.  Outcome: Progressing  Flowsheets (Taken 10/1/2024 1414)  Plan of Care Reviewed With: patient  Overall Patient Progress: improving  Goal: Patient-Specific Goal (Individualized)  Description: You can add care plan individualizations to a care plan. Examples of Individualization might be:  \"Parent requests to be called daily at 9am for status\", \"I have a hard time hearing out of my right ear\", or \"Do not touch me to wake me up as it startles  me\".  Outcome: Progressing  Goal: Absence of Hospital-Acquired Illness or Injury  Outcome: Progressing  Intervention: Identify and Manage Fall Risk  Recent Flowsheet Documentation  Taken 10/1/2024 1030 by Maria Del Carmen Mensah, RN  Safety Promotion/Fall Prevention: safety round/check completed  Intervention: Prevent Infection  Recent Flowsheet Documentation  Taken 10/1/2024 1030 by Maria Del Carmen Mensah, RN  Infection Prevention:   single patient room provided   rest/sleep promoted   hand hygiene promoted  Goal: Optimal Comfort and Wellbeing  Outcome: Progressing  Intervention: Monitor Pain and Promote Comfort  Recent Flowsheet Documentation  Taken 10/1/2024 1212 by " Maria Del Carmen Mensah RN  Pain Management Interventions: (medication offered, declined) --  Taken 10/1/2024 0840 by Maria Del Carmen Mensah RN  Pain Management Interventions: rest  Goal: Readiness for Transition of Care  Outcome: Progressing     Problem: Pain Acute  Goal: Optimal Pain Control and Function  Outcome: Progressing  Intervention: Develop Pain Management Plan  Recent Flowsheet Documentation  Taken 10/1/2024 1212 by Maria Del Carmen Mensah RN  Pain Management Interventions: (medication offered, declined) --  Taken 10/1/2024 0840 by Maria Del Carmen Mensah RN  Pain Management Interventions: rest  Intervention: Prevent or Manage Pain  Recent Flowsheet Documentation  Taken 10/1/2024 1030 by Maria Del Carmen Mensah RN  Medication Review/Management: medications reviewed     Problem: Infection  Goal: Absence of Infection Signs and Symptoms  Outcome: Progressing     Problem: Hernia Repair  Goal: Absence of Bleeding  Outcome: Progressing  Goal: Effective Bowel Elimination  Outcome: Progressing  Goal: Fluid and Electrolyte Balance  Outcome: Progressing  Goal: Absence of Infection Signs and Symptoms  Outcome: Progressing  Goal: Anesthesia/Sedation Recovery  Outcome: Progressing  Intervention: Optimize Anesthesia Recovery  Recent Flowsheet Documentation  Taken 10/1/2024 1030 by Maria Del Carmen Mensah RN  Safety Promotion/Fall Prevention: safety round/check completed  Goal: Optimal Pain Control and Function  Outcome: Progressing  Intervention: Prevent or Manage Pain  Recent Flowsheet Documentation  Taken 10/1/2024 1212 by Maria Del Carmen Mensah RN  Pain Management Interventions: (medication offered, declined) --  Taken 10/1/2024 0840 by Maria Del Carmen Mensah RN  Pain Management Interventions: rest  Goal: Nausea and Vomiting Relief  Outcome: Progressing  Goal: Effective Urinary Elimination  Outcome: Progressing  Goal: Effective Oxygenation and Ventilation  Outcome: Progressing

## 2024-10-01 NOTE — ANESTHESIA PREPROCEDURE EVALUATION
Anesthesia Pre-Procedure Evaluation    Patient: Junito Coelho   MRN: [id number] : 1980        Procedure : Procedure(s):  ROBOTIC ASSISTED VENTRAL HERNIA REPAIR WITH MESH, POSSIBLE OPEN          Past Medical History:   Diagnosis Date    Abdominal pain     Benign essential hypertension     Gastro-oesophageal reflux disease     Morbid obesity with BMI of 40.0-44.9, adult (H)     BMI 44      Past Surgical History:   Procedure Laterality Date    LAPAROSCOPIC CHOLECYSTECTOMY N/A 2015    Procedure: LAPAROSCOPIC CHOLECYSTECTOMY;  Surgeon: John Yepez MD;  Location: [facility]    ORTHOPEDIC SURGERY      right hand      No Known Allergies   Social History     Tobacco Use    Smoking status: Some Days     Current packs/day: 0.50     Average packs/day: 0.5 packs/day for 15.0 years (7.5 ttl pk-yrs)     Types: Cigarettes    Smokeless tobacco: Never   Substance Use Topics    Alcohol use: No     Alcohol/week: 0.0 standard drinks of alcohol      Wt Readings from Last 1 Encounters:   24 136.1 kg (300 lb)        Anesthesia Evaluation            ROS/MED HX  ENT/Pulmonary:  - neg pulmonary ROS     Neurologic:  - neg neurologic ROS     Cardiovascular:     (+)  hypertension- -   -  - -                                      METS/Exercise Tolerance:     Hematologic:  - neg hematologic  ROS     Musculoskeletal:  - neg musculoskeletal ROS     GI/Hepatic: Comment: Incarcerated hernia    (+) GERD,                   Renal/Genitourinary:     (+) renal disease, type: CRI,            Endo:     (+)               Obesity,       Psychiatric/Substance Use:  - neg psychiatric ROS     Infectious Disease:  - neg infectious disease ROS     Malignancy:       Other:            Physical Exam    Airway        Mallampati: II   TM distance: > 3 FB   Neck ROM: full   Mouth opening: > 3 cm    Respiratory Devices and Support         Dental       (+) Modest Abnormalities - crowns, retainers, 1 or 2 missing teeth      Cardiovascular    "cardiovascular exam normal          Pulmonary   pulmonary exam normal                OUTSIDE LABS:  CBC:   Lab Results   Component Value Date    WBC 13.9 (H) 09/30/2024    WBC 13.8 (H) 04/08/2021    HGB 19.4 (H) 09/30/2024    HGB 15.3 04/08/2021    HCT 54.5 (H) 09/30/2024    HCT 45.4 04/08/2021     09/30/2024     04/08/2021     BMP:   Lab Results   Component Value Date     09/30/2024     02/06/2024    POTASSIUM 4.2 09/30/2024    POTASSIUM 3.7 02/06/2024    CHLORIDE 97 (L) 09/30/2024    CHLORIDE 102 02/06/2024    CO2 21 (L) 09/30/2024    CO2 24 02/06/2024    BUN 16.0 09/30/2024    BUN 16.2 02/06/2024    CR 1.19 (H) 09/30/2024    CR 1.33 (H) 04/11/2024     (H) 09/30/2024    GLC 96 04/11/2024     COAGS:   Lab Results   Component Value Date    INR 0.91 04/05/2021     POC: No results found for: \"BGM\", \"HCG\", \"HCGS\"  HEPATIC:   Lab Results   Component Value Date    ALBUMIN 4.6 09/30/2024    PROTTOTAL 8.4 (H) 09/30/2024    ALT 64 09/30/2024    AST 36 09/30/2024    ALKPHOS 75 09/30/2024    BILITOTAL 0.8 09/30/2024     OTHER:   Lab Results   Component Value Date    LACT 1.8 09/30/2024    A1C 5.1 02/06/2024    RADHA 9.8 09/30/2024    LIPASE 31 09/30/2024    TSH 1.83 09/25/2020       Anesthesia Plan    ASA Status:  3, emergent       Anesthesia Type: General.     - Airway: ETT   Induction: Intravenous.   Maintenance: Balanced.        Consents    Anesthesia Plan(s) and associated risks, benefits, and realistic alternatives discussed. Questions answered and patient/representative(s) expressed understanding.     - Discussed:     - Discussed with:  Patient      - Extended Intubation/Ventilatory Support Discussed: No.      - Patient is DNR/DNI Status: No     Use of blood products discussed: No .     Postoperative Care    Pain management: IV analgesics, Oral pain medications, Multi-modal analgesia.   PONV prophylaxis: Ondansetron (or other 5HT-3), Dexamethasone or Solumedrol     Comments:               " "Antony Estevez MD    I have reviewed the pertinent notes and labs in the chart from the past 30 days and (re)examined the patient.  Any updates or changes from those notes are reflected in this note.         # Anion Gap Metabolic Acidosis: Highest Anion Gap = 19 mmol/L in last 2 days, will monitor and treat as appropriate      # Severe Obesity: Estimated body mass index is 41.84 kg/m  as calculated from the following:    Height as of this encounter: 1.803 m (5' 11\").    Weight as of this encounter: 136.1 kg (300 lb).      "

## 2024-10-01 NOTE — ED PROVIDER NOTES
"  Emergency Department Note      History of Present Illness     Chief Complaint   Abdominal Pain      HPI   Junito Coelho is a 44 year old male with a history of CKD Stage 2 and hypertension who presents with his sisters for a complaint of abdominal pain. The patient reports an onset of central abdominal pain this morning. It is constant and has not improved since the onset. He has a visible hernia on his central abdomen, and the pain is in the same area. He reports that the hernia earlier today was very hard, but is now softer. He does not have pain near or from the hernia at baseline. He is unsure if the area was red or warm earlier today. He is unsure if this feels similar to previous pancreatitis. His sisters report that he has been having significant vomiting all day, as well as episodes of diaphoresis. He had a similar episode a few weeks ago which resolved without intervention.    Independent Historian   Sister as detailed above     Review of External Notes       Past Medical History   Medical History and Problem List   Abdominal pain  Hypertension  GERD  Obesity   CKD Stage 2    Medications   Losartan  Empaglilflozin  Nebivolol    Surgical History   Right hand orthopedic surgery   Cholecystectomy  Physical Exam     Patient Vitals for the past 24 hrs:   BP Temp Temp src Pulse Resp SpO2 Height Weight   09/30/24 1908 (!) 169/127 97  F (36.1  C) Temporal 84 18 94 % 1.803 m (5' 11\") 136.1 kg (300 lb)     Physical Exam  Constitutional: Uncomfortable appearing.  HEENT: Atraumatic. Moist mucous membranes.  Neck: Soft.  Supple.  No JVD.  Cardiac: Regular rate and rhythm.  No murmur or rub.  Respiratory: Clear to auscultation bilaterally.  No respiratory distress.  No wheezing, rhonchi, or rales.  Abdomen: Soft with mid tenderness and distention. Non reducible ventral hernia.  Musculoskeletal: No edema.  Normal range of motion.  Neurologic: Alert and oriented.  Normal tone and bulk.    Skin: No rashes.  No " edema.  Psych: Normal affect.  Normal behavior.        Diagnostics     Lab Results   Labs Ordered and Resulted from Time of ED Arrival to Time of ED Departure   COMPREHENSIVE METABOLIC PANEL - Abnormal       Result Value    Sodium 137      Potassium 4.2      Carbon Dioxide (CO2) 21 (*)     Anion Gap 19 (*)     Urea Nitrogen 16.0      Creatinine 1.19 (*)     GFR Estimate 78      Calcium 9.8      Chloride 97 (*)     Glucose 193 (*)     Alkaline Phosphatase 75      AST 36      ALT 64      Protein Total 8.4 (*)     Albumin 4.6      Bilirubin Total 0.8     CBC WITH PLATELETS AND DIFFERENTIAL - Abnormal    WBC Count 13.9 (*)     RBC Count 6.23 (*)     Hemoglobin 19.4 (*)     Hematocrit 54.5 (*)     MCV 88      MCH 31.1      MCHC 35.6      RDW 12.3      Platelet Count 338      % Neutrophils 87      % Lymphocytes 9      % Monocytes 4      % Eosinophils 0      % Basophils 1      % Immature Granulocytes 0      NRBCs per 100 WBC 0      Absolute Neutrophils 12.0 (*)     Absolute Lymphocytes 1.2      Absolute Monocytes 0.5      Absolute Eosinophils 0.0      Absolute Basophils 0.1      Absolute Immature Granulocytes 0.1      Absolute NRBCs 0.0     LACTIC ACID WHOLE BLOOD WITH 1X REPEAT IN 2 HR WHEN >2 - Abnormal    Lactic Acid, Initial 2.5 (*)    LIPASE - Normal    Lipase 31     LACTIC ACID WHOLE BLOOD - Normal    Lactic Acid 1.8     ROUTINE UA WITH MICROSCOPIC REFLEX TO CULTURE   GLUCOSE MONITOR NURSING POCT       Imaging   CT Abdomen Pelvis w Contrast   Final Result   IMPRESSION:    1.  There is a moderate-sized umbilical ventral hernia containing a loop of small bowel with narrowing of the bowel loop along the right lateral margin of the hernia and proximal small bowel distention compatible with mechanical small bowel obstruction.    There is mucosal enhancement and mild stranding in the hernia sac and early incarceration cannot be excluded.      2.  Fatty liver.      3.  Splenomegaly.      4.  Cholecystectomy.      NOTE:  ABNORMAL REPORT      1.  THE DICTATION ABOVE DESCRIBES AN ABNORMALITY FOR WHICH FOLLOW-UP IS NEEDED.         Independent Interpretation   CT of the abdomen pelvis is concerning for a small bowel obstruction with ventral hernia.    ED Course      Medications Administered   Medications   ceFAZolin Sodium (ANCEF) injection 3 g (has no administration in time range)   ceFAZolin Sodium (ANCEF) injection 3 g (has no administration in time range)   ibuprofen (ADVIL/MOTRIN) tablet 600 mg (600 mg Oral $Given 9/30/24 1914)   ondansetron (ZOFRAN ODT) ODT tab 4 mg (4 mg Oral $Given 9/30/24 1914)   sodium chloride 0.9% BOLUS 1,000 mL (0 mLs Intravenous Stopped 9/30/24 2329)   HYDROmorphone (DILAUDID) injection 1 mg (1 mg Intravenous $Given 9/30/24 2058)   ondansetron (ZOFRAN) injection 4 mg (4 mg Intravenous $Given 9/30/24 2058)   iopamidol (ISOVUE-370) solution 500 mL (100 mLs Intravenous $Given 9/30/24 2124)   sodium chloride (PF) 0.9% PF flush 100 mL (65 mLs Intravenous $Given 9/30/24 2125)   HYDROmorphone (PF) (DILAUDID) injection 0.5 mg (0.5 mg Intravenous $Given 9/30/24 2345)       Procedures   Procedures     Discussion of Management   Surgery, Dr. Toussaint    ED Course        Additional Documentation  None    Medical Decision Making / Diagnosis     CMS Diagnoses: None    MIPS       None    MDM   Junito Coelho is a 44 year old male who is afebrile and hemodynamically stable.  Abdomen soft with a moderate-sized periumbilical ventral hernia that is nonreducible on my exam.  Lab workup as noted as above.  CT scan is noted as above with concern for small bowel obstruction related to the hernia.  I am unable to reduce the hernia after pain medication and consulted general surgery who evaluated the patient in the bedside and was unable to reduce the hernia as well so he will be taken emergently to the OR and admitted to the surgical service.  Discussed results and plan with patient and they are in agreement.  His questions were  answered and he was in stable condition at time of transfer to the OR.    Disposition   The patient was admitted to the hospital.     Diagnosis     ICD-10-CM    1. Incarcerated hernia  K46.0 Case Request: ROBOTIC ASSISTED VENTRAL HERNIA REPAIR WITH MESH, POSSIBLE OPEN     Case Request: ROBOTIC ASSISTED VENTRAL HERNIA REPAIR WITH MESH, POSSIBLE OPEN      2. Small bowel obstruction (H)  K56.609                  Scribe Disclosure:  I, Velia Suh, am serving as a scribe at 8:52 PM on 9/30/2024 to document services personally performed by Frandy Liu MD based on my observations and the provider's statements to me.        Frandy Liu MD  10/02/24 1033

## 2024-10-02 VITALS
HEART RATE: 67 BPM | BODY MASS INDEX: 42 KG/M2 | OXYGEN SATURATION: 94 % | WEIGHT: 300 LBS | RESPIRATION RATE: 16 BRPM | HEIGHT: 71 IN | TEMPERATURE: 97.6 F | DIASTOLIC BLOOD PRESSURE: 98 MMHG | SYSTOLIC BLOOD PRESSURE: 149 MMHG

## 2024-10-02 LAB — GLUCOSE BLDC GLUCOMTR-MCNC: 114 MG/DL (ref 70–99)

## 2024-10-02 PROCEDURE — 250N000013 HC RX MED GY IP 250 OP 250 PS 637: Performed by: STUDENT IN AN ORGANIZED HEALTH CARE EDUCATION/TRAINING PROGRAM

## 2024-10-02 PROCEDURE — 250N000013 HC RX MED GY IP 250 OP 250 PS 637: Performed by: INTERNAL MEDICINE

## 2024-10-02 PROCEDURE — 250N000011 HC RX IP 250 OP 636: Performed by: STUDENT IN AN ORGANIZED HEALTH CARE EDUCATION/TRAINING PROGRAM

## 2024-10-02 RX ORDER — IBUPROFEN 600 MG/1
600 TABLET, FILM COATED ORAL EVERY 6 HOURS PRN
Qty: 30 TABLET | Refills: 0 | Status: SHIPPED | OUTPATIENT
Start: 2024-10-02

## 2024-10-02 RX ADMIN — ACETAMINOPHEN 325MG 975 MG: 325 TABLET ORAL at 04:54

## 2024-10-02 RX ADMIN — LOSARTAN POTASSIUM 100 MG: 100 TABLET, FILM COATED ORAL at 08:25

## 2024-10-02 RX ADMIN — SENNOSIDES AND DOCUSATE SODIUM 1 TABLET: 8.6; 5 TABLET ORAL at 08:25

## 2024-10-02 RX ADMIN — NEBIVOLOL 5 MG: 5 TABLET ORAL at 08:25

## 2024-10-02 RX ADMIN — ACETAMINOPHEN 325MG 975 MG: 325 TABLET ORAL at 11:45

## 2024-10-02 RX ADMIN — POLYETHYLENE GLYCOL 3350 17 G: 17 POWDER, FOR SOLUTION ORAL at 08:25

## 2024-10-02 RX ADMIN — ENOXAPARIN SODIUM 40 MG: 40 INJECTION SUBCUTANEOUS at 08:25

## 2024-10-02 ASSESSMENT — ACTIVITIES OF DAILY LIVING (ADL)
ADLS_ACUITY_SCORE: 22
ADLS_ACUITY_SCORE: 21
ADLS_ACUITY_SCORE: 22
ADLS_ACUITY_SCORE: 21
ADLS_ACUITY_SCORE: 22
ADLS_ACUITY_SCORE: 21
ADLS_ACUITY_SCORE: 22

## 2024-10-02 NOTE — PLAN OF CARE
"Goal Outcome Evaluation:    Plan of Care Reviewed With: patient    Overall Patient Progress: improving    Outcome Evaluation: Up independently in room. Diet tolerated. PIV saline locked. Dressing CDI. Plan for discharge to home today.    Problem: Adult Inpatient Plan of Care  Goal: Plan of Care Review  Description: The Plan of Care Review/Shift note should be completed every shift.  The Outcome Evaluation is a brief statement about your assessment that the patient is improving, declining, or no change.  This information will be displayed automatically on your shift  note.  Outcome: Progressing  Flowsheets (Taken 10/2/2024 0014)  Outcome Evaluation: Up independently in room. Diet tolerated. PIV saline locked. Dressing CDI. Plan for discharge to home today.  Plan of Care Reviewed With: patient  Overall Patient Progress: improving  Goal: Patient-Specific Goal (Individualized)  Description: You can add care plan individualizations to a care plan. Examples of Individualization might be:  \"Parent requests to be called daily at 9am for status\", \"I have a hard time hearing out of my right ear\", or \"Do not touch me to wake me up as it startles  me\".  Outcome: Progressing  Goal: Absence of Hospital-Acquired Illness or Injury  Outcome: Progressing  Intervention: Identify and Manage Fall Risk  Recent Flowsheet Documentation  Taken 10/1/2024 2009 by Dimas Pichardo RN  Safety Promotion/Fall Prevention: safety round/check completed  Intervention: Prevent Skin Injury  Recent Flowsheet Documentation  Taken 10/1/2024 2009 by Dimas Pichardo RN  Body Position: position changed independently  Device Skin Pressure Protection:   absorbent pad utilized/changed   adhesive use limited  Intervention: Prevent and Manage VTE (Venous Thromboembolism) Risk  Recent Flowsheet Documentation  Taken 10/1/2024 2009 by Dimas Pichardo RN  VTE Prevention/Management: SCDs on (sequential compression devices)  Intervention: Prevent " Infection  Recent Flowsheet Documentation  Taken 10/1/2024 2009 by Dimas Pichardo RN  Infection Prevention:   single patient room provided   rest/sleep promoted   hand hygiene promoted  Goal: Optimal Comfort and Wellbeing  Outcome: Progressing  Goal: Readiness for Transition of Care  Outcome: Progressing     Problem: Pain Acute  Goal: Optimal Pain Control and Function  Outcome: Progressing  Intervention: Prevent or Manage Pain  Recent Flowsheet Documentation  Taken 10/1/2024 2009 by Dimas Pichardo RN  Medication Review/Management: medications reviewed     Problem: Infection  Goal: Absence of Infection Signs and Symptoms  Outcome: Progressing     Problem: Hernia Repair  Goal: Absence of Bleeding  Outcome: Progressing  Goal: Effective Bowel Elimination  Outcome: Progressing  Goal: Fluid and Electrolyte Balance  Outcome: Progressing  Goal: Absence of Infection Signs and Symptoms  Outcome: Progressing  Goal: Anesthesia/Sedation Recovery  Outcome: Progressing  Intervention: Optimize Anesthesia Recovery  Recent Flowsheet Documentation  Taken 10/1/2024 2009 by Dimas Pichardo RN  Safety Promotion/Fall Prevention: safety round/check completed  Goal: Optimal Pain Control and Function  Outcome: Progressing  Goal: Nausea and Vomiting Relief  Outcome: Progressing  Goal: Effective Urinary Elimination  Outcome: Progressing  Goal: Effective Oxygenation and Ventilation  Outcome: Progressing  Intervention: Optimize Oxygenation and Ventilation  Recent Flowsheet Documentation  Taken 10/1/2024 2009 by Dimas Pichardo RN  Head of Bed (HOB) Positioning: HOB at 45 degrees

## 2024-10-02 NOTE — PROGRESS NOTES
"[facility]   General Surgery Progress Note 10/2/2024         Assessment and Plan:   Assessment:   POD#1 s/p Procedure(s):  ROBOTIC ASSISTED PRIMARY VENTRAL HERNIA REPAIR  Bowel function returned +BMs      Plan:   -Diet as tolerated  -Increase activity as tolerated  -Pain Mgmt: oxycodone, tylenol and ibuprofen  -Hospitalist managing medically, appreciate assistance  -dispo: Plan to discharge today. Instructions in chart. Rx: oxycodone and ibuprofen         Interval History:   Sitting up in chair, doing well. Has some soreness as expected. Oxycodone controls his pain. He has been up walking, voiding independently and has had 3 BMs. Tolerating a regular diet. Wants to go home.          Physical Exam:   Blood pressure (!) 149/98, pulse 67, temperature 97.6  F (36.4  C), temperature source Temporal, resp. rate 16, height 1.803 m (5' 11\"), weight 136.1 kg (300 lb), SpO2 94%.    I/O last 3 completed shifts:  In: 360 [P.O.:360]  Out: 550 [Urine:550]    Abdomen: soft, ND, +mild central tenderness at site of hernia, no masses, +BS  Inc(s) - clean, dry, intact with steristrips, no erythema            Data:     Recent Labs   Lab Test 09/30/24  1953 04/08/21  0650 04/07/21  0643   HGB 19.4* 15.3 14.9   WBC 13.9* 13.8* 13.9*          Deshawn Lott PA-C  Text page [phone] 8am-4pm  After 4pm call [phone]     "

## 2024-10-03 NOTE — DISCHARGE SUMMARY
[facility] Discharge Summary    Junito Coelho MRN# [id number]   Age: 44 year old YOB: 1980     Date of Admission:  9/30/2024  Date of Discharge::  10/2/2024 11:53 AM  Admitting Physician:  Junito Toussaint MD  Discharge Physician:  Junito Toussaint MD       PCP:     Nadeem Kidd  REFERRING:   No referring provider defined for this encounter.          Admission Diagnoses:   Small bowel obstruction (H) [K56.609]  Incarcerated hernia [K46.0]         Discharge Diagnosis:     Small bowel obstruction (H) [K56.609]  Incarcerated hernia [K46.0]  Patient Active Problem List   Diagnosis    Morbid obesity (H)    HTN, goal below 140/90    Chronic kidney disease, stage 2 (mild)    Small bowel obstruction (H)    Incarcerated hernia            Procedures:     Procedure(s):  ROBOTIC ASSISTED PRIMARY VENTRAL HERNIA REPAIR        No other procedures performed during this admission           Discharge Medications:     Discharge Medication List as of 10/2/2024 11:42 AM        START taking these medications    Details   ibuprofen (ADVIL/MOTRIN) 600 MG tablet Take 1 tablet (600 mg) by mouth every 6 hours as needed for inflammatory pain., Disp-30 tablet, R-0, E-Prescribe      ondansetron (ZOFRAN ODT) 4 MG ODT tab Take 1 tablet (4 mg) by mouth every 6 hours as needed for nausea or vomiting., Disp-20 tablet, R-0, E-Prescribe      oxyCODONE (ROXICODONE) 5 MG tablet Take 1 tablet (5 mg) by mouth every 4 hours as needed for moderate pain., Disp-10 tablet, R-0, E-Prescribe           CONTINUE these medications which have NOT CHANGED    Details   losartan (COZAAR) 100 MG tablet Take 1 tablet (100 mg) by mouth every evening, Disp-90 tablet, R-3, E-Prescribe      nebivolol (BYSTOLIC) 5 MG tablet Take 1 tablet (5 mg) by mouth daily, Disp-90 tablet, R-3, E-PrescribeSubstitute for Atenolol.                   Consultations:   Consultation during this admission received from internal medicine          Brief History  of Illness:   Reason for admission requiring a surgical or invasive procedure:   Incarcerated hernia [K46.0]   The patient underwent the following procedure(s):   Procedure(s):  ROBOTIC ASSISTED PRIMARY VENTRAL HERNIA REPAIR   There were no immediate complications during this procedure.    Please refer to the full operative summary for details.           Hospital Course:   The patient's hospital course was unremarkable.  He recovered as anticipated and experienced no post-operative complications.           Discharge Instructions and Follow-Up:     Discharge diet: Regular   Discharge activity: No heavy lifting for 4 week(s)   Discharge follow-up: Follow up with Dr Toussaint in 2-3 weeks   Wound care: Steri-strips off in 7-10 days           Discharge Disposition:     Discharged to home      Attestation:  I have reviewed encounter's vital signs, notes, medications, labs and imaging.  Amount of time performed on this discharge summary: 10 minutes.    Junito Toussaint MD

## 2024-10-04 NOTE — ED TRIAGE NOTES
Pt comes in with abdominal pain that he states started today. He has a visible hernia.  Pt is vomiting in the ED lobby and is unable to sit in wheel chair due to pain.       Triage Assessment (Adult)       Row Name 09/30/24 7785          Triage Assessment    Airway WDL WDL        Respiratory WDL    Respiratory WDL WDL        Cardiac WDL    Cardiac WDL WDL                     
shoulder pain/injury

## 2025-03-14 DIAGNOSIS — I10 HYPERTENSION GOAL BP (BLOOD PRESSURE) < 140/90: ICD-10-CM

## 2025-03-17 RX ORDER — LOSARTAN POTASSIUM 100 MG/1
100 TABLET ORAL EVERY EVENING
Qty: 90 TABLET | Refills: 3 | Status: SHIPPED | OUTPATIENT
Start: 2025-03-17

## 2025-03-21 ENCOUNTER — OFFICE VISIT (OUTPATIENT)
Dept: FAMILY MEDICINE | Facility: CLINIC | Age: 45
End: 2025-03-21
Attending: INTERNAL MEDICINE
Payer: COMMERCIAL

## 2025-03-21 VITALS
HEIGHT: 71 IN | DIASTOLIC BLOOD PRESSURE: 108 MMHG | BODY MASS INDEX: 44.1 KG/M2 | WEIGHT: 315 LBS | SYSTOLIC BLOOD PRESSURE: 157 MMHG | TEMPERATURE: 97.2 F | RESPIRATION RATE: 10 BRPM | HEART RATE: 65 BPM | OXYGEN SATURATION: 97 %

## 2025-03-21 DIAGNOSIS — G47.30 SLEEP APNEA, UNSPECIFIED TYPE: Primary | ICD-10-CM

## 2025-03-21 DIAGNOSIS — I10 HTN, GOAL BELOW 140/90: ICD-10-CM

## 2025-03-21 PROCEDURE — 3077F SYST BP >= 140 MM HG: CPT | Performed by: INTERNAL MEDICINE

## 2025-03-21 PROCEDURE — 3080F DIAST BP >= 90 MM HG: CPT | Performed by: INTERNAL MEDICINE

## 2025-03-21 PROCEDURE — G2211 COMPLEX E/M VISIT ADD ON: HCPCS | Performed by: INTERNAL MEDICINE

## 2025-03-21 PROCEDURE — 99214 OFFICE O/P EST MOD 30 MIN: CPT | Performed by: INTERNAL MEDICINE

## 2025-03-21 NOTE — PATIENT INSTRUCTIONS
At [facility], we strive to deliver an exceptional experience to you, every time we see you. If you receive a survey, please let us know what we are doing well and/or what we could improve upon, as we do value your feedback.  If you have MyChart, you can expect to receive results automatically within 24 hours of their completion.  Your provider will send a note interpreting your results as well.   If you do not have MyChart, you should receive your results in about a week by mail.    Your care team:                            Family Medicine Internal Medicine   MD Nadeem Bush, MD Brynn Akbar, MD Sam Da Silva, MD Jessica Allen, PAWarrenC    Alvaro Yancey, MD Pediatrics   Leatha De Leon, MD Cami Ellsworth, MD Ivett Peters, APRN CNP Raven Merchant APRN CNP   MD Bambi Garcia, MD Kristy Peters, CNP     Leoncio Christie, CNP Same-Day Provider (No follow-up visits)   JOSE C Strickland, DNP Chelle Salinas, JOSE C Torrez, FNP, BC JIMMY OwenC     Clinic hours: Monday - Thursday 7 am-6 pm; Fridays 7 am-5 pm.   Urgent care: Monday - Friday 10 am- 8 pm; Saturday and Sunday 9 am-5 pm.    Clinic: [phone]       [facility]: Monday - Thursday 8 am - 7 pm; Friday 8 am - 6 pm  [facility]: [phone]

## 2025-03-21 NOTE — PROGRESS NOTES
[facility] INTERNAL MEDICINE NOTE    Junito Coelho is a 44 year old male who presents to clinic today for the following health issues:    Insomnia  Onset/Duration: always had trouble sleeping   Description: falls asleep wakes up from snoring,   Frequency of insomnia:  several times a week  Time to fall asleep (sleep latency): 15 minutes  Middle of night awakening:  YES  Early morning awakening:  YES  Progression of Symptoms:  same  Accompanying Signs & Symptoms:  Daytime sleepiness/napping: YES  Excessive snoring/apnea: YES  Restless legs: No  Waking to urinate: YES  Chronic pain:  No  Depression symptoms (if yes, do PHQ9): No  Anxiety symptoms (if yes, do JAISON-7): No  History:  Prior Insomnia: YES  New stressful situation: No  Precipitating factors:   Caffeine intake: YES  OTC decongestants: No  Any new medications: No  Alleviating factors:  Self medicating (alcohol, etc.):  No  Stress-reduction (exercise, yoga, meditation etc): No  Therapies tried and outcome: none      Patient Active Problem List   Diagnosis    Morbid obesity (H)    HTN, goal below 140/90    Chronic kidney disease, stage 2 (mild)    Small bowel obstruction (H)    Incarcerated hernia     Past Surgical History:   Procedure Laterality Date    DAVINCI XI HERNIORRHAPHY VENTRAL N/A 10/1/2024    Procedure: ROBOTIC ASSISTED PRIMARY VENTRAL HERNIA REPAIR;  Surgeon: Junito Toussaint MD;  Location:  OR    LAPAROSCOPIC CHOLECYSTECTOMY N/A 6/19/2015    Procedure: LAPAROSCOPIC CHOLECYSTECTOMY;  Surgeon: John Yepez MD;  Location: [facility]    ORTHOPEDIC SURGERY      right hand       Social History     Tobacco Use    Smoking status: Some Days     Current packs/day: 0.50     Average packs/day: 0.5 packs/day for 15.0 years (7.5 ttl pk-yrs)     Types: Cigarettes    Smokeless tobacco: Never   Substance Use Topics    Alcohol use: No     Alcohol/week: 0.0 standard drinks of alcohol      Family History   Problem Relation Age of Onset    Cerebrovascular Disease Mother 62        acute stroke with residual left sided hemiparesis    Hypertension Father     Coronary Artery Disease Father 56        heart attack at 56    Family History Negative Sister     Family History Negative Sister     Family History Negative Sister     Family History Negative Sister          No Known Allergies  Recent Labs   Lab Test 10/01/24  0656 09/30/24  1953 04/11/24  1518 02/06/24  1521 11/25/22  1117 11/25/22  1117 04/08/21  0650 04/07/21  0643 04/06/21  0632 04/05/21  1311 11/10/20  1443 09/25/20  0953   A1C  --   --   --  5.1  --   --   --   --   --   --   --  4.8   LDL  --   --   --   --   --   --   --   --   --  111*  --  72   HDL  --   --   --   --   --   --   --   --   --  30*  --  28*   TRIG  --   --   --   --   --   --   --   --   --  172*  --  264*   ALT  --  64 78*  --   --  81* 242* 382*   < >  --    < > 94*   CR 1.26* 1.19* 1.33* 1.22*   < > 1.17 0.91 0.97   < >  --    < > 1.20   GFRESTIMATED 73 78 68 76   < > 80 >90 >90   < >  --    < > 76   GFRESTBLACK  --   --   --   --   --   --  >90 >90   < >  --    < > 88   POTASSIUM 4.7 4.2  --  3.7   < > 3.9 3.4 3.4   < >  --    < > 3.9   TSH  --   --   --   --   --   --   --   --   --   --   --  1.83    < > = values in this interval not displayed.      BP Readings from Last 3 Encounters:   03/21/25 (!) 157/108   10/02/24 (!) 149/98   04/11/24 (!) 154/105    Wt Readings from Last 3 Encounters:   03/21/25 (!) 163.1 kg (359 lb 9.6 oz)   09/30/24 136.1 kg (300 lb)   04/11/24 (!) 162.4 kg (358 lb)                    ROS:  C: NEGATIVE for fever, chills, change in weight  I: NEGATIVE for worrisome rashes, moles or lesions  E: NEGATIVE for vision changes or irritation  E/M: NEGATIVE for ear, mouth and throat problems  R: NEGATIVE for significant cough or SOB  B: NEGATIVE for masses, tenderness or discharge  CV: NEGATIVE for chest pain, palpitations or peripheral edema  GI: NEGATIVE  "for nausea, abdominal pain, heartburn, or change in bowel habits  : NEGATIVE for frequency, dysuria, or hematuria  M: NEGATIVE for significant arthralgias or myalgia  N: NEGATIVE for weakness, dizziness or paresthesias  E: NEGATIVE for temperature intolerance, skin/hair changes  H: NEGATIVE for bleeding problems  P: NEGATIVE for changes in mood or affect    OBJECTIVE:                                                    BP (!) 157/108 (BP Location: Right arm, Patient Position: Sitting, Cuff Size: Adult Large)   Pulse 65   Temp 97.2  F (36.2  C) (Temporal)   Resp 10   Ht 1.803 m (5' 11\")   Wt (!) 163.1 kg (359 lb 9.6 oz)   SpO2 97%   BMI 50.15 kg/m    Body mass index is 50.15 kg/m .  GENERAL: alert and no distress  EYES: Eyes grossly normal to inspection, PERRL and conjunctivae and sclerae normal  HENT: ear canals and TM's normal, nose and mouth without ulcers or lesions  NECK: no adenopathy, no asymmetry, masses, or scars  RESP: lungs clear to auscultation - no rales, rhonchi or wheezes  CV: regular rate and rhythm, normal S1 S2, no S3 or S4, no murmur, click or rub, no peripheral edema  ABDOMEN: soft, nontender, no hepatosplenomegaly, no masses and bowel sounds normal  MS: no gross musculoskeletal defects noted, no edema  SKIN: no suspicious lesions or rashes  NEURO: Normal strength and tone, mentation intact and speech normal  PSYCH: mentation appears normal, affect normal/bright    Diagnostic Test Results:  No results found for any visits on 03/21/25.     ASSESSMENT/PLAN:                                                      Sleep apnea, unspecified type  Refer to Sleep Clinic. Complications include hypertension.    HTN, goal below 140/90  - amLODIPine (NORVASC) 10 MG tablet; Take 1 tablet (10 mg) by mouth daily.     Follow-up in 1 months or as needed.    Nadeem Kidd MD  [facility]     "

## 2025-03-23 ENCOUNTER — TELEPHONE (OUTPATIENT)
Dept: FAMILY MEDICINE | Facility: CLINIC | Age: 45
End: 2025-03-23
Payer: COMMERCIAL

## 2025-03-23 NOTE — TELEPHONE ENCOUNTER
amLODIPine-valsartan (EXFORGE) 5-160 MG tablet 90 tablet 1       Product backordered/unavailable    This medication is on long term back order will need to change to something else or separate out the med.

## 2025-03-24 RX ORDER — AMLODIPINE BESYLATE 10 MG/1
10 TABLET ORAL DAILY
Qty: 90 TABLET | Refills: 1 | Status: SHIPPED | OUTPATIENT
Start: 2025-03-24 | End: 2025-04-01 | Stop reason: DRUGHIGH

## 2025-03-24 NOTE — TELEPHONE ENCOUNTER
Outpatient Medication Detail     Disp Refills Start End DOUG   amLODIPine (NORVASC) 10 MG tablet 90 tablet 1 3/24/2025 -- No   Sig - Route: Take 1 tablet (10 mg) by mouth daily. - Oral   Sent to pharmacy as: amLODIPine Besylate 10 MG Oral Tablet (NORVASC)   Class: E-Prescribe   Notes to Pharmacy: Substitute for Exforge.   Order: [id number]   E-Prescribing Status: Receipt confirmed by pharmacy (3/24/2025  7:40 AM CDT)       Pharmacy    [id number] - [place], [place] - [address] [facility]     Associated Diagnoses    HTN, goal below 140/90 [I10]  - Primary

## 2025-03-31 ENCOUNTER — MYC MEDICAL ADVICE (OUTPATIENT)
Dept: FAMILY MEDICINE | Facility: CLINIC | Age: 45
End: 2025-03-31
Payer: COMMERCIAL

## 2025-04-01 RX ORDER — AMLODIPINE BESYLATE 5 MG/1
5 TABLET ORAL 2 TIMES DAILY
Qty: 60 TABLET | Refills: 5 | Status: SHIPPED | OUTPATIENT
Start: 2025-04-01

## 2025-04-14 ENCOUNTER — OFFICE VISIT (OUTPATIENT)
Dept: FAMILY MEDICINE | Facility: CLINIC | Age: 45
End: 2025-04-14
Payer: COMMERCIAL

## 2025-04-14 VITALS
HEART RATE: 71 BPM | HEIGHT: 71 IN | BODY MASS INDEX: 44.1 KG/M2 | DIASTOLIC BLOOD PRESSURE: 91 MMHG | SYSTOLIC BLOOD PRESSURE: 137 MMHG | RESPIRATION RATE: 14 BRPM | WEIGHT: 315 LBS | OXYGEN SATURATION: 98 %

## 2025-04-14 DIAGNOSIS — I10 HYPERTENSION GOAL BP (BLOOD PRESSURE) < 140/90: ICD-10-CM

## 2025-04-14 DIAGNOSIS — Z00.00 ENCOUNTER FOR ANNUAL PHYSICAL EXAM: Primary | ICD-10-CM

## 2025-04-14 DIAGNOSIS — Z13.6 CARDIOVASCULAR SCREENING; LDL GOAL LESS THAN 160: ICD-10-CM

## 2025-04-14 DIAGNOSIS — N18.2 CHRONIC KIDNEY DISEASE, STAGE 2 (MILD): ICD-10-CM

## 2025-04-14 DIAGNOSIS — Z23 NEED FOR DIPHTHERIA-TETANUS-PERTUSSIS (TDAP) VACCINE: ICD-10-CM

## 2025-04-14 DIAGNOSIS — L91.8 SKIN TAG: ICD-10-CM

## 2025-04-14 LAB
ALBUMIN SERPL BCG-MCNC: 4 G/DL (ref 3.5–5.2)
ALP SERPL-CCNC: 65 U/L (ref 40–150)
ALT SERPL W P-5'-P-CCNC: 63 U/L (ref 0–70)
ANION GAP SERPL CALCULATED.3IONS-SCNC: 13 MMOL/L (ref 7–15)
AST SERPL W P-5'-P-CCNC: 49 U/L (ref 0–45)
BASOPHILS # BLD AUTO: 0 10E3/UL (ref 0–0.2)
BASOPHILS NFR BLD AUTO: 0 %
BILIRUB SERPL-MCNC: 0.7 MG/DL
BUN SERPL-MCNC: 16.1 MG/DL (ref 6–20)
CALCIUM SERPL-MCNC: 9.3 MG/DL (ref 8.8–10.4)
CHLORIDE SERPL-SCNC: 97 MMOL/L (ref 98–107)
CHOLEST SERPL-MCNC: 169 MG/DL
CREAT SERPL-MCNC: 1.19 MG/DL (ref 0.67–1.17)
CREAT UR-MCNC: 205 MG/DL
EGFRCR SERPLBLD CKD-EPI 2021: 78 ML/MIN/1.73M2
EOSINOPHIL # BLD AUTO: 0.4 10E3/UL (ref 0–0.7)
EOSINOPHIL NFR BLD AUTO: 4 %
ERYTHROCYTE [DISTWIDTH] IN BLOOD BY AUTOMATED COUNT: 12.6 % (ref 10–15)
EST. AVERAGE GLUCOSE BLD GHB EST-MCNC: 105 MG/DL
FASTING STATUS PATIENT QL REPORTED: NO
FASTING STATUS PATIENT QL REPORTED: NO
GLUCOSE SERPL-MCNC: 118 MG/DL (ref 70–99)
HBA1C MFR BLD: 5.3 % (ref 0–5.6)
HCO3 SERPL-SCNC: 26 MMOL/L (ref 22–29)
HCT VFR BLD AUTO: 47.5 % (ref 40–53)
HDLC SERPL-MCNC: 22 MG/DL
HGB BLD-MCNC: 17.3 G/DL (ref 13.3–17.7)
IMM GRANULOCYTES # BLD: 0 10E3/UL
IMM GRANULOCYTES NFR BLD: 0 %
LDLC SERPL CALC-MCNC: ABNORMAL MG/DL
LDLC SERPL DIRECT ASSAY-MCNC: 81 MG/DL
LYMPHOCYTES # BLD AUTO: 3.2 10E3/UL (ref 0.8–5.3)
LYMPHOCYTES NFR BLD AUTO: 31 %
MCH RBC QN AUTO: 31.2 PG (ref 26.5–33)
MCHC RBC AUTO-ENTMCNC: 36.4 G/DL (ref 31.5–36.5)
MCV RBC AUTO: 86 FL (ref 78–100)
MICROALBUMIN UR-MCNC: 70.8 MG/L
MICROALBUMIN/CREAT UR: 34.54 MG/G CR (ref 0–17)
MONOCYTES # BLD AUTO: 0.9 10E3/UL (ref 0–1.3)
MONOCYTES NFR BLD AUTO: 9 %
NEUTROPHILS # BLD AUTO: 5.5 10E3/UL (ref 1.6–8.3)
NEUTROPHILS NFR BLD AUTO: 54 %
NONHDLC SERPL-MCNC: 147 MG/DL
PLATELET # BLD AUTO: 275 10E3/UL (ref 150–450)
POTASSIUM SERPL-SCNC: 3.4 MMOL/L (ref 3.4–5.3)
PROT SERPL-MCNC: 7.4 G/DL (ref 6.4–8.3)
RBC # BLD AUTO: 5.54 10E6/UL (ref 4.4–5.9)
SODIUM SERPL-SCNC: 136 MMOL/L (ref 135–145)
TRIGL SERPL-MCNC: 478 MG/DL
WBC # BLD AUTO: 10.1 10E3/UL (ref 4–11)

## 2025-04-14 PROCEDURE — 82570 ASSAY OF URINE CREATININE: CPT | Performed by: INTERNAL MEDICINE

## 2025-04-14 PROCEDURE — 82043 UR ALBUMIN QUANTITATIVE: CPT | Performed by: INTERNAL MEDICINE

## 2025-04-14 PROCEDURE — 80053 COMPREHEN METABOLIC PANEL: CPT | Performed by: INTERNAL MEDICINE

## 2025-04-14 PROCEDURE — 83036 HEMOGLOBIN GLYCOSYLATED A1C: CPT | Performed by: INTERNAL MEDICINE

## 2025-04-14 PROCEDURE — 36415 COLL VENOUS BLD VENIPUNCTURE: CPT | Performed by: INTERNAL MEDICINE

## 2025-04-14 PROCEDURE — 80061 LIPID PANEL: CPT | Performed by: INTERNAL MEDICINE

## 2025-04-14 PROCEDURE — 85025 COMPLETE CBC W/AUTO DIFF WBC: CPT | Performed by: INTERNAL MEDICINE

## 2025-04-14 PROCEDURE — 83721 ASSAY OF BLOOD LIPOPROTEIN: CPT | Mod: 59 | Performed by: INTERNAL MEDICINE

## 2025-04-14 SDOH — HEALTH STABILITY: PHYSICAL HEALTH: ON AVERAGE, HOW MANY DAYS PER WEEK DO YOU ENGAGE IN MODERATE TO STRENUOUS EXERCISE (LIKE A BRISK WALK)?: 0 DAYS

## 2025-04-14 SDOH — HEALTH STABILITY: PHYSICAL HEALTH: ON AVERAGE, HOW MANY MINUTES DO YOU ENGAGE IN EXERCISE AT THIS LEVEL?: 0 MIN

## 2025-04-14 ASSESSMENT — PAIN SCALES - GENERAL: PAINLEVEL_OUTOF10: NO PAIN (0)

## 2025-04-14 ASSESSMENT — SOCIAL DETERMINANTS OF HEALTH (SDOH): HOW OFTEN DO YOU GET TOGETHER WITH FRIENDS OR RELATIVES?: ONCE A WEEK

## 2025-04-14 NOTE — PATIENT INSTRUCTIONS
At [facility], we strive to deliver an exceptional experience to you, every time we see you. If you receive a survey, please let us know what we are doing well and/or what we could improve upon, as we do value your feedback.  If you have MyChart, you can expect to receive results automatically within 24 hours of their completion.  Your provider will send a note interpreting your results as well.   If you do not have MyChart, you should receive your results in about a week by mail.    Your care team:                            Family Medicine Internal Medicine   MD Nadeem Bush, MD Brynn Akbar, MD Sam Da Silva, MD Jessica Allen, PA-C    Alvaro Yancey, MD Pediatrics   Leatha De Leon, MD Cami Ellsworth, JOSE C Cuevas CNP Raven Montenegro, MD Bambi Vargas, MD Kristy Peters, CNP     Tayler Roche, PA-C Same-Day Provider (No follow-up visits)   JOSE C Strickland, JANELLE Salinas, PA-C    Viv Mcadams PA-C     Clinic hours: Monday - Thursday 7 am-6 pm; Fridays 7 am-5 pm.   Urgent care: Monday - Friday 10 am- 8 pm; Saturday and Sunday 9 am-5 pm.    Clinic: [phone]       [facility]: Monday - Thursday 8 am - 7 pm; Friday 8 am - 6 pm  [facility]: [phone]

## 2025-04-14 NOTE — PROGRESS NOTES
Preventive Care Visit  [facility]  Nadeem Kidd MD, Internal Medicine  Apr 14, 2025  {Provider  Link to SmartSet :705162}    {PROVIDER CHARTING PREFERENCE:278089}    Subjective   Jorge L is a 44 year old, presenting for the following:  Physical        4/14/2025    12:28 PM   Additional Questions   Roomed by jaylon WAGGONER  ***   {MA/LPN/RN Pre-Provider Visit Orders- hCG/UA/Strep (Optional):430959}  {SUPERLIST (Optional):878200}  {additonal problems for provider to add (Optional):541144}  Advance Care Planning  Patient does not have a Health Care Directive: {ADVANCE_DIRECTIVE_STATUS:194382}      4/14/2025   General Health   How would you rate your overall physical health? Good   Feel stress (tense, anxious, or unable to sleep) Not at all         4/14/2025   Nutrition   Three or more servings of calcium each day? (!) I DON'T KNOW   Diet: Regular (no restrictions)   How many servings of fruit and vegetables per day? (!) 0-1   How many sweetened beverages each day? (!) 2         4/14/2025   Exercise   Days per week of moderate/strenous exercise 0 days   Average minutes spent exercising at this level 0 min   (!) EXERCISE CONCERN      4/14/2025   Social Factors   Frequency of gathering with friends or relatives Once a week   Worry food won't last until get money to buy more No   Food not last or not have enough money for food? No   Do you have housing? (Housing is defined as stable permanent housing and does not include staying ouside in a car, in a tent, in an abandoned building, in an overnight shelter, or couch-surfing.) Yes   Are you worried about losing your housing? No   Lack of transportation? No   Unable to get utilities (heat,electricity)? No         4/14/2025   Dental   Dentist two times every year? Yes           4/11/2024   TB Screening   Were you born outside of the [place]? No           Today's PHQ-2 Score:       4/14/2025    12:27 PM   PHQ-2 ( 1999 [employer])   Q1: Little interest  or pleasure in doing things 0   Q2: Feeling down, depressed or hopeless 0   PHQ-2 Score 0    Q1: Little interest or pleasure in doing things Not at all   Q2: Feeling down, depressed or hopeless Not at all   PHQ-2 Score 0       Patient-reported           4/14/2025   Substance Use   Alcohol more than 3/day or more than 7/wk Yes   How often do you have a drink containing alcohol 2 to 3 times a week   How many alcohol drinks on typical day 5 or 6   How often do you have 5+ drinks at one occasion Weekly   Audit 2/3 Score 5   How often not able to stop drinking once started Never   How often failed to do what normally expected Never   How often needed first drink in am after a heavy drinking session Never   How often feeling of guilt or remorse after drinking Never   How often unable to remember what happened the night before Never   Have you or someone else been injured because of your drinking No   Has anyone been concerned or suggested you cut down on drinking No   TOTAL SCORE - AUDIT 8   Do you use any other substances recreationally? No     Social History     Tobacco Use    Smoking status: Some Days     Current packs/day: 0.50     Average packs/day: 0.5 packs/day for 15.0 years (7.5 ttl pk-yrs)     Types: Cigarettes    Smokeless tobacco: Never   Vaping Use    Vaping status: Never Used   Substance Use Topics    Alcohol use: No     Alcohol/week: 0.0 standard drinks of alcohol    Drug use: No     {Provider  If there are gaps in the social history shown above, please follow the link to update and then refresh the note Link to Social and Substance History :522045}      4/14/2025   STI Screening   New sexual partner(s) since last STI/HIV test? No   ASCVD Risk   The 10-year ASCVD risk score (David RODRIGUEZ, et al., 2019) is: 11.8%    Values used to calculate the score:      Age: 44 years      Sex: Male      Is Non- : No      Diabetic: No      Tobacco smoker: Yes      Systolic Blood Pressure: 157  "mmHg      Is BP treated: Yes      HDL Cholesterol: 30 mg/dL      Total Cholesterol: 175 mg/dL        4/14/2025   Contraception/Family Planning   Questions about contraception or family planning No     {Provider  REQUIRED FOR AWV Use the storyboard to review patient history, after sections have been marked as reviewed, refresh note to capture documentation:995038}   Reviewed and updated as needed this visit by Provider                    {HISTORY OPTIONS (Optional):941624}    {ROS Picklists (Optional):704700}     Objective    Exam  There were no vitals taken for this visit.   Estimated body mass index is 50.15 kg/m  as calculated from the following:    Height as of 3/21/25: 1.803 m (5' 11\").    Weight as of 3/21/25: 163.1 kg (359 lb 9.6 oz).    Physical Exam  {Exam Choices (Optional):834087}        Signed Electronically by: Nadeem Kidd MD  {Email feedback regarding this note to [email or web address]   :249473}  "

## 2025-04-23 ENCOUNTER — OFFICE VISIT (OUTPATIENT)
Dept: SLEEP MEDICINE | Facility: CLINIC | Age: 45
End: 2025-04-23
Payer: COMMERCIAL

## 2025-04-23 VITALS
HEART RATE: 84 BPM | HEIGHT: 71 IN | BODY MASS INDEX: 44.1 KG/M2 | DIASTOLIC BLOOD PRESSURE: 96 MMHG | WEIGHT: 315 LBS | OXYGEN SATURATION: 95 % | SYSTOLIC BLOOD PRESSURE: 137 MMHG

## 2025-04-23 DIAGNOSIS — R06.83 SNORING: Primary | ICD-10-CM

## 2025-04-23 DIAGNOSIS — I10 PRIMARY HYPERTENSION: ICD-10-CM

## 2025-04-23 DIAGNOSIS — E66.01 OBESITY, MORBID, BMI 50 OR HIGHER (H): ICD-10-CM

## 2025-04-23 DIAGNOSIS — G47.10 HYPERSOMNIA, UNSPECIFIED: ICD-10-CM

## 2025-04-23 DIAGNOSIS — G47.30 OBSERVED SLEEP APNEA: ICD-10-CM

## 2025-04-23 PROCEDURE — 99203 OFFICE O/P NEW LOW 30 MIN: CPT | Performed by: INTERNAL MEDICINE

## 2025-04-23 PROCEDURE — 3075F SYST BP GE 130 - 139MM HG: CPT | Performed by: INTERNAL MEDICINE

## 2025-04-23 PROCEDURE — 1126F AMNT PAIN NOTED NONE PRSNT: CPT | Performed by: INTERNAL MEDICINE

## 2025-04-23 PROCEDURE — 3080F DIAST BP >= 90 MM HG: CPT | Performed by: INTERNAL MEDICINE

## 2025-04-23 ASSESSMENT — SLEEP AND FATIGUE QUESTIONNAIRES
HOW LIKELY ARE YOU TO NOD OFF OR FALL ASLEEP WHILE SITTING AND READING: MODERATE CHANCE OF DOZING
HOW LIKELY ARE YOU TO NOD OFF OR FALL ASLEEP IN A CAR, WHILE STOPPED FOR A FEW MINUTES IN TRAFFIC: WOULD NEVER DOZE
HOW LIKELY ARE YOU TO NOD OFF OR FALL ASLEEP WHILE WATCHING TV: MODERATE CHANCE OF DOZING
HOW LIKELY ARE YOU TO NOD OFF OR FALL ASLEEP WHEN YOU ARE A PASSENGER IN A CAR FOR AN HOUR WITHOUT A BREAK: MODERATE CHANCE OF DOZING
HOW LIKELY ARE YOU TO NOD OFF OR FALL ASLEEP WHILE SITTING AND TALKING TO SOMEONE: SLIGHT CHANCE OF DOZING
HOW LIKELY ARE YOU TO NOD OFF OR FALL ASLEEP WHILE SITTING QUIETLY AFTER LUNCH WITHOUT ALCOHOL: MODERATE CHANCE OF DOZING
HOW LIKELY ARE YOU TO NOD OFF OR FALL ASLEEP WHILE LYING DOWN TO REST IN THE AFTERNOON WHEN CIRCUMSTANCES PERMIT: HIGH CHANCE OF DOZING
HOW LIKELY ARE YOU TO NOD OFF OR FALL ASLEEP WHILE SITTING INACTIVE IN A PUBLIC PLACE: SLIGHT CHANCE OF DOZING

## 2025-04-23 NOTE — NURSING NOTE
"Chief Complaint   Patient presents with    Sleep Problem     Low energy, loud snoring, waking self up, possible apnea, dry mouth       Initial BP (!) 137/96   Pulse 84   Ht 1.803 m (5' 10.98\")   Wt (!) 163.7 kg (361 lb)   SpO2 95%   BMI 50.37 kg/m   Estimated body mass index is 50.37 kg/m  as calculated from the following:    Height as of this encounter: 1.803 m (5' 10.98\").    Weight as of this encounter: 163.7 kg (361 lb).    Medication Reconciliation: complete  ESS: 13  Neck circumference: 22 inches / 56 centimeters.    DME: n/a    ADIA Fallon      "

## 2025-04-23 NOTE — PROGRESS NOTES
Sleep Consultation:    Date on this visit: 4/23/2025    Junito Coelho  is referred by Nadeem Kidd for a sleep consultation.     Primary Physician: Nadeem Kidd     Junito Coelho reports nightly snoring and poor quality of sleep for many years.     His medical history is significant for hypertension.     Junito does snore every night. Patient's wife does complain of snoring and snorting. He does have witnessed apneas.They occasionally sleep separately.  Patient sleeps on his back, side, and stomach. He has frequent morning dry mouth, denies no morning headaches and restless legs.     Junito goes to sleep at 11:00 PM during the week. He wakes up at 5:00 AM. He falls asleep in 5 minutes.  Junito denies difficulty falling asleep.  He wakes up 2-4 times a night for 5 minutes before falling back to sleep.  Junito wakes up to go to the bathroom and uncertain reasons.  On weekends, Junito goes to sleep at 11:00 PM.  He wakes up at 7:00 AM. He falls asleep in 5 minutes.      Junito denies any sleep walking, sleep talking, dream enactment, sleep paralysis, and hypnogogic/hypnopompic hallucinations.    Junito denies difficulty breathing through his nose.      Patient's [place] Sleepiness score 13/24 consistent with daytime sleepiness.      Junito naps 5-6 times per week for 30-60 minutes, feels refreshed after naps. He takes some inadvertant naps.  He denies closing eyes, dozing, and falling asleep while driving. Patient was counseled on the importance of driving while alert, to pull over if drowsy, or nap before getting into the vehicle if sleepy.      He uses 3 sodas/day. Last caffeine intake is usually before 6 PM.    Problem List:  Patient Active Problem List    Diagnosis Date Noted    Small bowel obstruction (H) 09/30/2024     Priority: Medium    Incarcerated hernia 09/30/2024     Priority: Medium    Chronic kidney disease, stage 2 (mild) 12/18/2022     Priority: Medium    HTN, goal below 140/90  "11/25/2022     Priority: Medium    Morbid obesity (H)      Priority: Medium     BMI 44          Past Medical/Surgical History:  Past Medical History:   Diagnosis Date    Abdominal pain     Benign essential hypertension     CKD (chronic kidney disease) stage 2, GFR 60-89 ml/min     Gastro-oesophageal reflux disease     Morbid obesity with BMI of 40.0-44.9, adult (H)     BMI 44     Physical Examination:  Vitals: BP (!) 137/96   Pulse 84   Ht 1.803 m (5' 10.98\")   Wt (!) 163.7 kg (361 lb)   SpO2 95%   BMI 50.37 kg/m    BMI= Body mass index is 50.37 kg/m .  GENERAL APPEARANCE: healthy, alert, and no distress  HENT: oropharynx crowded and tongue base enlarged  NEURO: mentation intact and speech normal  PSYCH: mentation appears normal and affect normal/bright  Mallampati Class: IV.  Tonsillar Stage: 1  hidden by pillars.    Impression/Plan:    Probable obstructive sleep apnea  Hypertension   Severe obesity     Patient is a 44 years old male, BMI of 50, medical comorbidity of hypertension, who presents with a history of frequent loud snoring, witnessed apneas, and daytime sleepiness.  Oropharynx is Mallampati class IV on examination    There is high pretest probability for obstructive sleep apnea.  Obesity places him at risk for sleep associated hypoventilation.  Most recent BMP showed normal total CO2 level at 26, and he has normal resting O2 saturation, which screens low for risk for hypoventilation.     An overnight sleep study is recommended for further evaluation of sleep apnea.    Plan:     Home sleep apnea testing     He will follow up with me after his sleep study has been competed to review the results and discuss plan of care.       Polysomnography & HST reviewed.  Limitations of HST reviewed.   Obstructive sleep apnea reviewed.  Complications of untreated sleep apnea were reviewed.    Electronically signed by Dr. Obed Abel, Diplomate, Sleep Medicine, ABPN         CC: Nadeem Kidd              "

## 2025-07-08 ENCOUNTER — TELEPHONE (OUTPATIENT)
Dept: FAMILY MEDICINE | Facility: CLINIC | Age: 45
End: 2025-07-08
Payer: COMMERCIAL

## 2025-07-08 NOTE — TELEPHONE ENCOUNTER
Patient Quality Outreach    Patient is due for the following:   Hypertension -  BP check      Topic Date Due    Pneumococcal Vaccine (1 of 2 - PCV) Never done    Hepatitis B Vaccine (1 of 3 - 19+ 3-dose series) Never done    COVID-19 Vaccine (4 - 2024-25 season) 09/01/2024    Diptheria Tetanus Pertussis (DTAP/TDAP/TD) Vaccine (2 - Td or Tdap) 05/06/2025       Action(s) Taken:   Schedule a nurse only visit for blood pressure check and immunization update.     Type of outreach:    Sent [employer] message.    Questions for provider review:    None         Pia Kelsey MA  Chart routed to None.

## 2025-07-14 ASSESSMENT — SLEEP AND FATIGUE QUESTIONNAIRES
HOW LIKELY ARE YOU TO NOD OFF OR FALL ASLEEP WHILE WATCHING TV: MODERATE CHANCE OF DOZING
HOW LIKELY ARE YOU TO NOD OFF OR FALL ASLEEP WHILE SITTING AND READING: MODERATE CHANCE OF DOZING
HOW LIKELY ARE YOU TO NOD OFF OR FALL ASLEEP WHILE SITTING QUIETLY AFTER LUNCH WITHOUT ALCOHOL: WOULD NEVER DOZE
HOW LIKELY ARE YOU TO NOD OFF OR FALL ASLEEP WHILE SITTING AND TALKING TO SOMEONE: WOULD NEVER DOZE
HOW LIKELY ARE YOU TO NOD OFF OR FALL ASLEEP IN A CAR, WHILE STOPPED FOR A FEW MINUTES IN TRAFFIC: WOULD NEVER DOZE
HOW LIKELY ARE YOU TO NOD OFF OR FALL ASLEEP WHEN YOU ARE A PASSENGER IN A CAR FOR AN HOUR WITHOUT A BREAK: MODERATE CHANCE OF DOZING
HOW LIKELY ARE YOU TO NOD OFF OR FALL ASLEEP WHILE SITTING INACTIVE IN A PUBLIC PLACE: WOULD NEVER DOZE
HOW LIKELY ARE YOU TO NOD OFF OR FALL ASLEEP WHILE LYING DOWN TO REST IN THE AFTERNOON WHEN CIRCUMSTANCES PERMIT: MODERATE CHANCE OF DOZING

## 2025-07-17 ENCOUNTER — OFFICE VISIT (OUTPATIENT)
Dept: SLEEP MEDICINE | Facility: CLINIC | Age: 45
End: 2025-07-17
Payer: COMMERCIAL

## 2025-07-17 DIAGNOSIS — G47.30 OBSERVED SLEEP APNEA: ICD-10-CM

## 2025-07-17 DIAGNOSIS — R06.83 SNORING: ICD-10-CM

## 2025-07-17 DIAGNOSIS — E66.01 OBESITY, MORBID, BMI 50 OR HIGHER (H): ICD-10-CM

## 2025-07-17 DIAGNOSIS — G47.10 HYPERSOMNIA, UNSPECIFIED: ICD-10-CM

## 2025-07-17 DIAGNOSIS — I10 PRIMARY HYPERTENSION: ICD-10-CM

## 2025-07-17 NOTE — PROGRESS NOTES
Pt is completing a home sleep test. Pt was instructed on how to put on the Noxturnal T3 device and associated equipment before going to bed and given the opportunity to practice putting it on before leaving the sleep center. Pt was reminded to bring the home sleep test kit back to the center tomorrow, at the scheduled time for download and reporting. Patient was instructed to complete study using the following treatment?  None  Neck circumference: 56 CM / 22 inches.  ESS: 13  Stop Ban  Device number: 26  Natalia Spence CMA on 2025 at 10:47 AM

## 2025-07-22 ENCOUNTER — TELEPHONE (OUTPATIENT)
Dept: SLEEP MEDICINE | Facility: CLINIC | Age: 45
End: 2025-07-22
Payer: COMMERCIAL

## 2025-07-22 DIAGNOSIS — G47.33 OSA (OBSTRUCTIVE SLEEP APNEA): Primary | ICD-10-CM

## 2025-07-22 DIAGNOSIS — G47.36 HYPOXEMIA ASSOCIATED WITH SLEEP: ICD-10-CM

## 2025-07-22 DIAGNOSIS — E66.01 OBESITY, MORBID, BMI 50 OR HIGHER (H): ICD-10-CM

## 2025-07-22 NOTE — TELEPHONE ENCOUNTER
Sleep Study Follow-Up :    Date: 7/22/2025    Junito Coelho was contacted today for follow-up of his home sleep study done on 7/17/25 at the [facility] for possible sleep apnea.    Analysis Time: 405.6 minutes     Respiration:  Sleep Associated Hypoxemia: Sustained hypoxemia was present. Baseline oxygen saturation was 95 %. Time with saturation less than 89% was 263.9 minutes. Time with saturation less than 90% was 280.5 minutes. The lowest oxygen saturation was 62.0%.  Snoring: Snoring was present 4% of the time with an average level of 70 dB. Duration of time snoring above 70 dB was 16.3 minutes.     Respiratory events: The home study revealed a presence of 140 obstructive apneas and 157 mixed and central apneas. There were 361 hypopneas resulting in a combined apnea/hypopnea index [AHI] of 97 events per hour with  97 per hour supine, 0  per hour prone, 0 per hour upright, 0  per hour left side, and 0 per hour right side.     Position: Percent of time spent: Supine 100%, prone 0%, upright 0%, on left 0%, on right 0%.     Heart Rate: By Pulse Oximetry normal rate was noted.     Assessment:  Severe obstructive sleep apnea.  There was a minor proportion of central apneas, which were seen interspersed with obstructive events.  Sleep associated hypoxemia was present.    These findings were reviewed with patient.     Past medical/surgical history, family history, social history, medications and allergies were reviewed.      Problem List:  Patient Active Problem List    Diagnosis Date Noted    JAYDEN (obstructive sleep apnea) 07/22/2025     Priority: Medium     Severe JAYDEN.       Small bowel obstruction (H) 09/30/2024     Priority: Medium    Incarcerated hernia 09/30/2024     Priority: Medium    Chronic kidney disease, stage 2 (mild) 12/18/2022     Priority: Medium    HTN, goal below 140/90 11/25/2022     Priority: Medium    Morbid obesity (H)      Priority: Medium     BMI 44           Impression/Plan:    Severe obstructive sleep apnea  Sleep associated hypoxemia     Home sleep test result was discussed in detail with the patient.  We discussed severe obstructive sleep apnea, persistent oxygen desaturations, sleep associate hypoxemia, consequences and therapy options.    Considering sustained hypoxemia, and presence of central sleep apnea, I recommended performing a titration PSG.    Plan:     Titration PSG with TCM for optimization of PAP therapy   Follow up after titration     Electronically signed by Dr. Obed Abel, Diplomate, Sleep Medicine, ABPN

## (undated) DEVICE — DAVINCI XI ESU FORCE BIPOLAR 8MM 471405

## (undated) DEVICE — GLOVE BIOGEL PI MICRO INDICATOR UNDERGLOVE SZ 8.0 48980

## (undated) DEVICE — DAVINCI HOT SHEARS TIP COVER  400180

## (undated) DEVICE — DAVINCI XI OBTURATOR BLADELESS 8MM 470359

## (undated) DEVICE — RULER SURGICAL PLASTIC STRL LF CS628

## (undated) DEVICE — ANTIFOG SOLUTION SEE SHARP 150M TROCAR SWABS 30978

## (undated) DEVICE — GLOVE BIOGEL PI MICRO SZ 7.5 48575

## (undated) DEVICE — ESU GROUND PAD ADULT W/CORD E7507

## (undated) DEVICE — SU VICRYL 4-0 PS-2 18" UND J496H

## (undated) DEVICE — DRAPE LAP W/ARMBOARD 29410

## (undated) DEVICE — LINEN ORTHO ACL PACK 5447

## (undated) DEVICE — GOWN IMPERVIOUS SPECIALTY XLG/XLONG 32474

## (undated) DEVICE — DRAPE MAYO STAND 23X54 8337

## (undated) DEVICE — GLOVE BIOGEL PI MICRO INDICATOR UNDERGLOVE SZ 6.5 48965

## (undated) DEVICE — ENDO TROCAR FIRST ENTRY KII FIOS Z-THRD 05X100MM CTF03

## (undated) DEVICE — DECANTER VIAL 2006S

## (undated) DEVICE — DAVINCI XI DRAPE ARM 470015

## (undated) DEVICE — SOL WATER IRRIG 1000ML BOTTLE 2F7114

## (undated) DEVICE — ESU PENCIL W/HOLSTER E2350H

## (undated) DEVICE — BLADE KNIFE SURG 11 371111

## (undated) DEVICE — ESU ELEC BLADE 2.75" COATED/INSULATED E1455

## (undated) DEVICE — ANTIFOG SOLUTION W/FOAM PAD 31142527

## (undated) DEVICE — ENDO POUCH UNIVERSAL RETRIEVAL SYSTEM INZII 5MM CD003

## (undated) DEVICE — LUBRICANT INST ELECTROLUBE EL101

## (undated) DEVICE — Device

## (undated) DEVICE — DAVINCI XI SEAL UNIVERSAL 5-12MM 470500

## (undated) DEVICE — SU STRATAFIX PDS PLUS 0 CT-2 9" SXPP1A446

## (undated) DEVICE — SU STRATAFIX PDS PLUS 0 CT-2 18" SXPP1A407

## (undated) RX ORDER — FENTANYL CITRATE 50 UG/ML
INJECTION, SOLUTION INTRAMUSCULAR; INTRAVENOUS
Status: DISPENSED
Start: 2024-09-30

## (undated) RX ORDER — CEFAZOLIN SODIUM/WATER 3 G/30 ML
SYRINGE (ML) INTRAVENOUS
Status: DISPENSED
Start: 2024-10-01

## (undated) RX ORDER — BUPIVACAINE HYDROCHLORIDE AND EPINEPHRINE 5; 5 MG/ML; UG/ML
INJECTION, SOLUTION EPIDURAL; INTRACAUDAL; PERINEURAL
Status: DISPENSED
Start: 2024-09-30

## (undated) RX ORDER — FENTANYL CITRATE-0.9 % NACL/PF 10 MCG/ML
PLASTIC BAG, INJECTION (ML) INTRAVENOUS
Status: DISPENSED
Start: 2024-10-01